# Patient Record
Sex: FEMALE | Race: WHITE | NOT HISPANIC OR LATINO | Employment: FULL TIME | URBAN - NONMETROPOLITAN AREA
[De-identification: names, ages, dates, MRNs, and addresses within clinical notes are randomized per-mention and may not be internally consistent; named-entity substitution may affect disease eponyms.]

---

## 2017-01-06 ENCOUNTER — LAB (OUTPATIENT)
Dept: INTERNAL MEDICINE | Facility: CLINIC | Age: 42
End: 2017-01-06

## 2017-01-06 DIAGNOSIS — E55.9 VITAMIN D DEFICIENCY DISEASE: ICD-10-CM

## 2017-01-06 DIAGNOSIS — I10 ESSENTIAL HYPERTENSION: ICD-10-CM

## 2017-01-06 DIAGNOSIS — G25.81 RLS (RESTLESS LEGS SYNDROME): ICD-10-CM

## 2017-01-06 DIAGNOSIS — M79.7 FIBROMYALGIA: ICD-10-CM

## 2017-01-06 LAB
25(OH)D3 SERPL-MCNC: 33.5 NG/ML
ALBUMIN SERPL-MCNC: 4.2 G/DL (ref 3.2–4.8)
ALBUMIN/GLOB SERPL: 1.4 G/DL (ref 1.5–2.5)
ALP SERPL-CCNC: 84 U/L (ref 25–100)
ALT SERPL W P-5'-P-CCNC: 17 U/L (ref 7–40)
ANION GAP SERPL CALCULATED.3IONS-SCNC: 8 MMOL/L (ref 3–11)
ARTICHOKE IGE QN: 97 MG/DL (ref 0–130)
AST SERPL-CCNC: 19 U/L (ref 0–33)
BASOPHILS # BLD AUTO: 0.03 10*3/MM3 (ref 0–0.2)
BASOPHILS NFR BLD AUTO: 0.4 % (ref 0–1)
BILIRUB SERPL-MCNC: 0.3 MG/DL (ref 0.3–1.2)
BUN BLD-MCNC: 21 MG/DL (ref 9–23)
BUN/CREAT SERPL: 35 (ref 7–25)
CALCIUM SPEC-SCNC: 10 MG/DL (ref 8.7–10.4)
CHLORIDE SERPL-SCNC: 101 MMOL/L (ref 99–109)
CHOLEST SERPL-MCNC: 204 MG/DL (ref 0–200)
CO2 SERPL-SCNC: 30 MMOL/L (ref 20–31)
CREAT BLD-MCNC: 0.6 MG/DL (ref 0.6–1.3)
CRP SERPL-MCNC: 16.4 MG/DL (ref 0–10)
CRP SERPL-MCNC: 16.62 MG/DL (ref 0–5)
DEPRECATED RDW RBC AUTO: 43.6 FL (ref 37–54)
EOSINOPHIL # BLD AUTO: 0.06 10*3/MM3 (ref 0.1–0.3)
EOSINOPHIL NFR BLD AUTO: 0.8 % (ref 0–3)
ERYTHROCYTE [DISTWIDTH] IN BLOOD BY AUTOMATED COUNT: 13.4 % (ref 11.3–14.5)
GFR SERPL CREATININE-BSD FRML MDRD: 110 ML/MIN/1.73
GLOBULIN UR ELPH-MCNC: 2.9 GM/DL
GLUCOSE BLD-MCNC: 83 MG/DL (ref 70–100)
HCT VFR BLD AUTO: 40.1 % (ref 34.5–44)
HCYS SERPL-MCNC: 13.6 UMOL/L (ref 5–13.9)
HDLC SERPL-MCNC: 96 MG/DL (ref 40–60)
HGB BLD-MCNC: 13 G/DL (ref 11.5–15.5)
IMM GRANULOCYTES # BLD: 0.03 10*3/MM3 (ref 0–0.03)
IMM GRANULOCYTES NFR BLD: 0.4 % (ref 0–0.6)
LYMPHOCYTES # BLD AUTO: 2.23 10*3/MM3 (ref 0.6–4.8)
LYMPHOCYTES NFR BLD AUTO: 29.2 % (ref 24–44)
MCH RBC QN AUTO: 28.8 PG (ref 27–31)
MCHC RBC AUTO-ENTMCNC: 32.4 G/DL (ref 32–36)
MCV RBC AUTO: 88.9 FL (ref 80–99)
MONOCYTES # BLD AUTO: 0.73 10*3/MM3 (ref 0–1)
MONOCYTES NFR BLD AUTO: 9.6 % (ref 0–12)
NEUTROPHILS # BLD AUTO: 4.55 10*3/MM3 (ref 1.5–8.3)
NEUTROPHILS NFR BLD AUTO: 59.6 % (ref 41–71)
PLATELET # BLD AUTO: 313 10*3/MM3 (ref 150–450)
PMV BLD AUTO: 9.5 FL (ref 6–12)
POTASSIUM BLD-SCNC: 4.1 MMOL/L (ref 3.5–5.5)
PROT SERPL-MCNC: 7.1 G/DL (ref 5.7–8.2)
RBC # BLD AUTO: 4.51 10*6/MM3 (ref 3.89–5.14)
SODIUM BLD-SCNC: 139 MMOL/L (ref 132–146)
TRIGL SERPL-MCNC: 55 MG/DL (ref 0–150)
TSH SERPL DL<=0.05 MIU/L-ACNC: 1.05 MIU/ML (ref 0.35–5.35)
WBC NRBC COR # BLD: 7.63 10*3/MM3 (ref 3.5–10.8)

## 2017-01-06 PROCEDURE — 82306 VITAMIN D 25 HYDROXY: CPT | Performed by: INTERNAL MEDICINE

## 2017-01-06 PROCEDURE — 86141 C-REACTIVE PROTEIN HS: CPT | Performed by: INTERNAL MEDICINE

## 2017-01-06 PROCEDURE — 80061 LIPID PANEL: CPT | Performed by: INTERNAL MEDICINE

## 2017-01-06 PROCEDURE — 36415 COLL VENOUS BLD VENIPUNCTURE: CPT | Performed by: INTERNAL MEDICINE

## 2017-01-06 PROCEDURE — 85025 COMPLETE CBC W/AUTO DIFF WBC: CPT | Performed by: INTERNAL MEDICINE

## 2017-01-06 PROCEDURE — 80053 COMPREHEN METABOLIC PANEL: CPT | Performed by: INTERNAL MEDICINE

## 2017-01-06 PROCEDURE — 84443 ASSAY THYROID STIM HORMONE: CPT | Performed by: INTERNAL MEDICINE

## 2017-01-06 PROCEDURE — 86140 C-REACTIVE PROTEIN: CPT | Performed by: INTERNAL MEDICINE

## 2017-01-06 PROCEDURE — 83090 ASSAY OF HOMOCYSTEINE: CPT | Performed by: INTERNAL MEDICINE

## 2017-01-09 RX ORDER — DOXYCYCLINE HYCLATE 100 MG/1
CAPSULE ORAL
Qty: 30 CAPSULE | Refills: 5 | Status: SHIPPED | OUTPATIENT
Start: 2017-01-09 | End: 2017-08-03 | Stop reason: SDUPTHER

## 2017-01-11 ENCOUNTER — HOSPITAL ENCOUNTER (OUTPATIENT)
Dept: SLEEP MEDICINE | Facility: HOSPITAL | Age: 42
Discharge: HOME | End: 2017-01-12
Attending: INTERNAL MEDICINE

## 2017-02-01 DIAGNOSIS — G47.33 OSA (OBSTRUCTIVE SLEEP APNEA): Primary | ICD-10-CM

## 2017-02-08 ENCOUNTER — OFFICE VISIT (OUTPATIENT)
Dept: GASTROENTEROLOGY | Facility: CLINIC | Age: 42
End: 2017-02-08

## 2017-02-08 VITALS
DIASTOLIC BLOOD PRESSURE: 92 MMHG | WEIGHT: 150.6 LBS | BODY MASS INDEX: 28.43 KG/M2 | TEMPERATURE: 98 F | HEART RATE: 75 BPM | HEIGHT: 61 IN | OXYGEN SATURATION: 99 % | SYSTOLIC BLOOD PRESSURE: 122 MMHG

## 2017-02-08 DIAGNOSIS — R13.13 PHARYNGEAL DYSPHAGIA: ICD-10-CM

## 2017-02-08 DIAGNOSIS — Z79.1 NSAID LONG-TERM USE: ICD-10-CM

## 2017-02-08 DIAGNOSIS — Z80.0 FAMILY HISTORY OF COLON CANCER: ICD-10-CM

## 2017-02-08 DIAGNOSIS — Z79.2 LONG TERM (CURRENT) USE OF ANTIBIOTICS: ICD-10-CM

## 2017-02-08 DIAGNOSIS — K21.00 GASTROESOPHAGEAL REFLUX DISEASE WITH ESOPHAGITIS: Primary | ICD-10-CM

## 2017-02-08 DIAGNOSIS — K27.9 PUD (PEPTIC ULCER DISEASE): ICD-10-CM

## 2017-02-08 PROCEDURE — 99215 OFFICE O/P EST HI 40 MIN: CPT | Performed by: NURSE PRACTITIONER

## 2017-02-08 RX ORDER — AZITHROMYCIN 1 G
1 PACKET (EA) ORAL
COMMUNITY
End: 2017-08-03

## 2017-02-08 NOTE — PROGRESS NOTES
NEW PATIENT NOTE  Patient: HONEY LAMA : 1975  Date of Service: 2017  Dear Dr.Arthur RAYSHAWN Naylor MD   Thank you for your referral of this patient for evaluation of GERD  CC: Heartburn (Colon/ EGD consult)  Assessment/Plan                                             ASSESSMENT & PLANS     Gastroesophageal reflux disease with esophagitis  Pharyngeal dysphagia  NSAID long-term use  Long term (current) use of antibiotics  Hx of PUD (peptic ulcer disease)  -     Schedule for EGD  · Continue Prilosec 40 mg once daily  · Anti-reflux measures.  Written instructions given.    Chronic constipation.  Symptoms controlled  Family history of colon cancer. 1st cousin w/ colon cancer (stage 4) dx  at age 44  · Continue Linzess once daily  · Screening colonoscopy at age 45     DISCUSSION: The above plan was delineated in details with patient and all questions and concerns were answered.  Patient is also given contact information.  Patient is to return as scheduled or sooner if new problems arise  Subjective                                                     SUBJECTIVE   History of Present Illness  Ms. Honey Lama is a 42 y.o. female presents to the clinic today for an evaluation of Heartburn (Colon/ EGD consult)  Hx of GERD x 17 yrs.  Dr jerez in [place] did EGDs x 2. Hx of erosion. No hx of Hpylori.   Last EGD was done by Dr Mata about 7-8 yrs ago.  Was told that she has a large HH.  Pt was previously treated with Acidphex, protonix, prilosec, nexium.  Has not tried Dexilant d/t insurance    Recently her sx worsens w/  intermittent esophageal dysphagia to solid w/ meat and cornbread  1 soda a day. Takes Naproxen q hs for neck pain r/t fibromyalgia.  Takes daily Doxycycline for acne r/t PCOS (Polycystic Ovarian syndrome). Currently on Zpack   Normally about 1-2 rounds a year for URI sx, but pt took more abx previously    Chronic constipation is controlled with once daily Linzess.Pt denies dark black  stools or bright red blood in the stools, in the toilet bowl, or on the toilet tissue.    ROS:Review of Systems   Constitutional: Negative for activity change, appetite change, fatigue, fever and unexpected weight change.   HENT: Positive for trouble swallowing and voice change. Negative for hearing loss.    Eyes: Negative for visual disturbance.   Respiratory: Positive for cough and choking. Negative for chest tightness and shortness of breath.    Cardiovascular: Negative for chest pain.   Gastrointestinal: Negative for abdominal distention, abdominal pain, anal bleeding, blood in stool, constipation, diarrhea, nausea, rectal pain and vomiting.        Heartburn   Endocrine: Negative for polydipsia and polyphagia.   Genitourinary: Negative.    Musculoskeletal: Negative for gait problem and joint swelling.   Skin: Negative for color change and rash.   Allergic/Immunologic: Negative for food allergies.   Neurological: Negative for dizziness, seizures and speech difficulty.   Hematological: Negative for adenopathy.   Psychiatric/Behavioral: Negative for confusion.     PAST MED HX: Pt  has a past medical history of Allergic rhinitis; Asthma; Bursitis of left hip; Bursitis of left hip; Diarrhea; H/O chest x-ray (2015); Migraine; Nausea with vomiting; Sore throat; and Strep pharyngitis.  PAST SURG HX: Pt  has a past surgical history that includes  section; Cholecystectomy; Breast surgery; Other surgical history; Mouth surgery; and Cystectomy.  FAM HX: family history includes Alzheimer's disease in her maternal grandmother and paternal grandfather; Arthritis in her father, paternal grandfather, and paternal grandmother; Cancer in her maternal aunt, maternal uncle, and other; Colon cancer (stage 4) in her 1st cousin at age 44 s/p surgery and chemo; Diabetes in her father; ABBE disease in her father; Heart attack in her paternal grandfather; Hyperlipidemia in her father; Hypertension in her father, paternal  grandfather, and paternal grandmother; Kidney disease in her father; Other in her maternal grandfather; Stroke in her maternal grandmother and paternal aunt.  SOC HX: Pt  reports that she has never smoked. She does not have any smokeless tobacco history on file. She reports that she drinks alcohol.  Objective                                                           OBJECTIVE   MEDS: •  albuterol (PROAIR HFA) 108 (90 BASE) MCG/ACT inhaler, ProAir  (90 Base) MCG/ACT Inhalation Aerosol Solution; Patient Sig: ProAir  (90 Base) MCG/ACT Inhalation Aerosol Solution INHALE 1 TO 2 PUFFS EVERY 4 TO 6 HOURS AS NEEDED.; 1; 0; 24-Jul-2014; Active, Disp: , Rfl:   •  azithromycin (ZITHROMAX) 1 G powder, Take 1 packet by mouth. For 5 days, Disp: , Rfl:   •  carvedilol (COREG) 12.5 MG tablet, Take 1 tablet by mouth 2 (Two) Times a Day., Disp: 90 tablet, Rfl: 3  •  Cetirizine HCl (ZYRTEC ALLERGY) 10 MG capsule, Take  by mouth., Disp: , Rfl:   •  cyclobenzaprine (FLEXERIL) 10 MG tablet, Take 1 tablet by mouth 3 (Three) Times a Day As Needed for muscle spasms., Disp: 30 tablet, Rfl: 3  •  doxycycline (VIBRAMYCIN) 100 MG capsule, TAKE ONE CAPSULE BY MOUTH ONCE DAILY, Disp: 30 capsule, Rfl: 5  •  DULoxetine (CYMBALTA) 60 MG capsule, Take  by mouth daily., Disp: , Rfl:   •  Linaclotide (LINZESS) 145 MCG capsule, 1 tab daily prn, Disp: 30 capsule, Rfl: 3  •  lisdexamfetamine (VYVANSE) 50 MG capsule, 1 tab daily, Disp: 30 capsule, Rfl: 0  •  metFORMIN (GLUCOPHAGE) 500 MG tablet, Take 1 tablet by mouth 2 (Two) Times a Day With Meals., Disp: 60 tablet, Rfl: 5  •  montelukast (SINGULAIR) 10 MG tablet, TAKE ONE TABLET BY MOUTH AT BEDTIME, Disp: 30 tablet, Rfl: 11  •  naproxen (NAPROSYN) 500 MG tablet, Take  by mouth., Disp: , Rfl:   •  norethindrone-ethinyl estradiol (MICROGESTIN) 1-20 MG-MCG per tablet, Take  by mouth., Disp: , Rfl:   •  omeprazole (PriLOSEC) 40 MG capsule, Take  by mouth daily., Disp: , Rfl:   •  OXcarbazepine  (TRILEPTAL) 300 MG tablet, Take 300 mg by mouth. 2 tablets at bedtime, Disp: , Rfl:   •  pregabalin (LYRICA) 75 MG capsule, Take  by mouth 2 (two) times a day., Disp: , Rfl:   •  spironolactone (ALDACTONE) 25 MG tablet, Take 25 mg by mouth Daily., Disp: , Rfl:   Lab Results   Component Value Date    WBC 7.63 01/06/2017    HGB 13.0 01/06/2017    HCT 40.1 01/06/2017    MCV 88.9 01/06/2017     01/06/2017     Lab Results   Component Value Date    AST 19 01/06/2017    ALT 17 01/06/2017    ALKPHOS 84 01/06/2017    BILITOT 0.3 01/06/2017    ALBUMIN 4.20 01/06/2017     Lab Results   Component Value Date    LIPASE 356 (H) 01/23/2015     Lab Results   Component Value Date     01/06/2017    K 4.1 01/06/2017     01/06/2017    CO2 30.0 01/06/2017    BUN 21 01/06/2017    CREATININE 0.60 01/06/2017    GLUCOSE 83 01/06/2017    CALCIUM 10.0 01/06/2017    ANIONGAP 8.0 01/06/2017     Lab Results   Component Value Date    HGBA1C 5.2 10/20/2015     Wt Readings from Last 5 Encounters:   02/08/17 150 lb 9.6 oz (68.3 kg)   12/28/16 149 lb (67.6 kg)   12/13/16 149 lb (67.6 kg)   06/01/16 149 lb (67.6 kg)   10/14/15 142 lb 15.9 oz (64.9 kg)   body mass index is 28.69 kg/(m^2).,Temp: 98 °F (36.7 °C),BP: 122/92,Heart Rate: 75   Physical Exam  General Well developed; well nourished; no acute distress.   ENT Good dentition.  Oral mucosa pink & moist without thrush or lesions.    Neck Neck supple; trachea midline. No thyromegaly   Resp CTA; no rhonchi, rales, or wheezes.  Respiration effort normal  CV RRR; normal S1, S2; no M/R/G. No lower extremity edema  GI Abd soft, NT, ND, normal active bowel sounds.  No HSM.  No abd hernia  Skin No rash; no lesions; no bruises.  Skin turgor normal  Musc No clubbing; no cyanosis.  Gait steady  Psych Oriented to time, place, and person.  Appropriate affect  Thank you kindly for allowing me to be part of this patient’s care.    Pt care team: Hayley PLASCENCIA & Roseann Guidry,  A  [facility]--Gastroenterology  [phone]    CC: Dr.Arthur RAYSHAWN Naylor MD  [address] [place] [address] / [place] [address] FAX:[phone]

## 2017-02-08 NOTE — PATIENT INSTRUCTIONS
Avoid NSAIDs (Non-Steroid Anti-Inflammatory Drugs) products (i.e Ibuprofen, Aleve, Advil, Exedrin, BC Powder, diclofenac, meloxicam, & Naproxen, etc). Talk to your doctor/medical provider for alternative approach for management such as physical therapy, exercises, muscle relaxants, etc.     In some cases, your doctor will have to keep you on NSAIDS products to treat your other medical conditions.  Should you need to continue any NSAID product,  please continue to take your acid reducer.  Be be aware of long-term and frequent NSAIDS' potential side effects. These include, but limited to, stomach ulcer, bleeding risks, and kidney injury. Take the medication with food and to stop if you experience any GI upset. Call if you have vomitting, abdominal pain, or black/bloody stools.     Hiatal Hernia  A hiatal hernia occurs when part of your stomach slides above the muscle that separates your abdomen from your chest (diaphragm). You can be born with a hiatal hernia (congenital), or it may develop over time. In almost all cases of hiatal hernia, only the top part of the stomach pushes through.   Many people have a hiatal hernia with no symptoms. The larger the hernia, the more likely that you will have symptoms. In some cases, a hiatal hernia allows stomach acid to flow back into the tube that carries food from your mouth to your stomach (esophagus). This may cause heartburn symptoms. Severe heartburn symptoms may mean you have developed a condition called gastroesophageal reflux disease (GERD).   CAUSES   Hiatal hernias are caused by a weakness in the opening (hiatus) where your esophagus passes through your diaphragm to attach to the upper part of your stomach. You may be born with a weakness in your hiatus, or a weakness can develop.  RISK FACTORS  Older age is a major risk factor for a hiatal hernia. Anything that increases pressure on your diaphragm can also increase your risk of a hiatal hernia. This  includes:  · Pregnancy.  · Excess weight.  · Frequent constipation.  SIGNS AND SYMPTOMS   People with a hiatal hernia often have no symptoms. If symptoms develop, they are almost always caused by GERD. They may include:  · Heartburn.  · Belching.  · Indigestion.  · Trouble swallowing.  · Coughing or wheezing.   · Sore throat.  · Hoarseness.  · Chest pain.  DIAGNOSIS   A hiatal hernia is sometimes found during an exam for another problem. Your health care provider may suspect a hiatal hernia if you have symptoms of GERD. Tests may be done to diagnose GERD. These may include:  · X-rays of your stomach or chest.  · An upper gastrointestinal (GI) series. This is an X-ray exam of your GI tract involving the use of a chalky liquid that you swallow. The liquid shows up clearly on the X-ray.  · Endoscopy. This is a procedure to look into your stomach using a thin, flexible tube that has a tiny camera and light on the end of it.  TREATMENT   If you have no symptoms, you may not need treatment. If you have symptoms, treatment may include:  · Dietary and lifestyle changes to help reduce GERD symptoms.  · Medicines. These may include:    Over-the-counter antacids.    Medicines that make your stomach empty more quickly.    Medicines that block the production of stomach acid (H2 blockers).    Stronger medicines to reduce stomach acid (proton pump inhibitors).  · You may need surgery to repair the hernia if other treatments are not helping.  HOME CARE INSTRUCTIONS   · Take all medicines as directed by your health care provider.  · Quit smoking, if you smoke.  · Try to achieve and maintain a healthy body weight.  · Eat frequent small meals instead of three large meals a day. This keeps your stomach from getting too full.    Eat slowly.    Do not lie down right after eating.     Do not eat 1-2 hours before bed.    · Do not drink beverages with caffeine. These include cola, coffee, cocoa, and tea.  · Do not drink alcohol.  · Avoid  foods that can make symptoms of GERD worse. These may include:    Fatty foods.    Citrus fruits.    Other foods and drinks that contain acid.  · Avoid putting pressure on your belly. Anything that puts pressure on your belly increases the amount of acid that may be pushed up into your esophagus.      Avoid bending over, especially after eating.    Raise the head of your bed by putting blocks under the legs. This keeps your head and esophagus higher than your stomach.    Do not wear tight clothing around your chest or stomach.    Try not to strain when having a bowel movement, when urinating, or when lifting heavy objects.  SEEK MEDICAL CARE IF:  · Your symptoms are not controlled with medicines or lifestyle changes.  · You are having trouble swallowing.  · You have coughing or wheezing that will not go away.  SEEK IMMEDIATE MEDICAL CARE IF:  · Your pain is getting worse.  · Your pain spreads to your arms, neck, jaw, teeth, or back.  · You have shortness of breath.  · You sweat for no reason.  · You feel sick to your stomach (nauseous) or vomit.  · You vomit blood.  · You have bright red blood in your stools.  · You have black, tarry stools.       This information is not intended to replace advice given to you by your health care provider. Make sure you discuss any questions you have with your health care provider.     Document Released: 03/09/2005 Document Revised: 01/08/2016 Document Reviewed: 12/05/2014  [employer] Interactive Patient Education ©2016 [employer]  Food Choices for Gastroesophageal Reflux Disease, Adult  When you have gastroesophageal reflux disease (GERD), the foods you eat and your eating habits are very important. Choosing the right foods can help ease the discomfort of GERD.  WHAT GENERAL GUIDELINES DO I NEED TO FOLLOW?  · Choose fruits, vegetables, whole grains, low-fat dairy products, and low-fat meat, fish, and poultry.  · Limit fats such as oils, salad dressings, butter, nuts, and  avocado.  · Keep a food diary to identify foods that cause symptoms.  · Avoid foods that cause reflux. These may be different for different people.  · Eat frequent small meals instead of three large meals each day.  · Eat your meals slowly, in a relaxed setting.  · Limit fried foods.  · Cook foods using methods other than frying.  · Avoid drinking alcohol.  · Avoid drinking large amounts of liquids with your meals.  · Avoid bending over or lying down until 2-3 hours after eating.  WHAT FOODS ARE NOT RECOMMENDED?  The following are some foods and drinks that may worsen your symptoms:  Vegetables  Tomatoes. Tomato juice. Tomato and spaghetti sauce. Chili peppers. Onion and garlic. Horseradish.  Fruits  Oranges, grapefruit, and lemon (fruit and juice).  Meats  High-fat meats, fish, and poultry. This includes hot dogs, ribs, ham, sausage, salami, and calzada.  Dairy  Whole milk and chocolate milk. Sour cream. Cream. Butter. Ice cream. Cream cheese.   Beverages  Coffee and tea, with or without caffeine. Carbonated beverages or energy drinks.  Condiments  Hot sauce. Barbecue sauce.   Sweets/Desserts  Chocolate and cocoa. Donuts. Peppermint and spearmint.  Fats and Oils  High-fat foods, including French fries and potato chips.  Other  Vinegar. Strong spices, such as black pepper, white pepper, red pepper, cayenne, scott powder, cloves, ginger, and chili powder.  The items listed above may not be a complete list of foods and beverages to avoid. Contact your dietitian for more information.     This information is not intended to replace advice given to you by your health care provider. Make sure you discuss any questions you have with your health care provider.     Document Released: 12/18/2006 Document Revised: 01/08/2016 Document Reviewed: 10/22/2014  [employer] Interactive Patient Education ©2016 [employer]    Gastroesophageal Reflux Disease, Adult  Normally, food travels down the esophagus and stays in the stomach to be  digested. However, when a person has gastroesophageal reflux disease (GERD), food and stomach acid move back up into the esophagus. When this happens, the esophagus becomes sore and inflamed. Over time, GERD can create small holes (ulcers) in the lining of the esophagus.   CAUSES  This condition is caused by a problem with the muscle between the esophagus and the stomach (lower esophageal sphincter, or LES). Normally, the LES muscle closes after food passes through the esophagus to the stomach. When the LES is weakened or abnormal, it does not close properly, and that allows food and stomach acid to go back up into the esophagus. The LES can be weakened by certain dietary substances, medicines, and medical conditions, including:  · Tobacco use.  · Pregnancy.  · Having a hiatal hernia.  · Heavy alcohol use.  · Certain foods and beverages, such as coffee, chocolate, onions, and peppermint.  RISK FACTORS  This condition is more likely to develop in:  · People who have an increased body weight.  · People who have connective tissue disorders.  · People who use NSAID medicines.  SYMPTOMS  Symptoms of this condition include:  · Heartburn.  · Difficult or painful swallowing.  · The feeling of having a lump in the throat.  · A bitter taste in the mouth.  · Bad breath.  · Having a large amount of saliva.  · Having an upset or bloated stomach.  · Belching.  · Chest pain.  · Shortness of breath or wheezing.  · Ongoing (chronic) cough or a night-time cough.  · Wearing away of tooth enamel.  · Weight loss.  Different conditions can cause chest pain. Make sure to see your health care provider if you experience chest pain.  DIAGNOSIS  Your health care provider will take a medical history and perform a physical exam. To determine if you have mild or severe GERD, your health care provider may also monitor how you respond to treatment. You may also have other tests, including:  · An endoscopy to examine your stomach and esophagus with  a small camera.  · A test that measures the acidity level in your esophagus.  · A test that measures how much pressure is on your esophagus.  · A barium swallow or modified barium swallow to show the shape, size, and functioning of your esophagus.  TREATMENT  The goal of treatment is to help relieve your symptoms and to prevent complications. Treatment for this condition may vary depending on how severe your symptoms are. Your health care provider may recommend:  · Changes to your diet.  · Medicine.  · Surgery.  HOME CARE INSTRUCTIONS  Diet  · Follow a diet as recommended by your health care provider. This may involve avoiding foods and drinks such as:    Coffee and tea (with or without caffeine).    Drinks that contain alcohol.    Energy drinks and sports drinks.    Carbonated drinks or sodas.    Chocolate and cocoa.    Peppermint and mint flavorings.    Garlic and onions.    Horseradish.    Spicy and acidic foods, including peppers, chili powder, scott powder, vinegar, hot sauces, and barbecue sauce.    Citrus fruit juices and citrus fruits, such as oranges, arun, and limes.    Tomato-based foods, such as red sauce, chili, salsa, and pizza with red sauce.    Fried and fatty foods, such as donuts, french fries, potato chips, and high-fat dressings.    High-fat meats, such as hot dogs and fatty cuts of red and white meats, such as rib eye steak, sausage, ham, and calzada.    High-fat dairy items, such as whole milk, butter, and cream cheese.  · Eat small, frequent meals instead of large meals.  · Avoid drinking large amounts of liquid with your meals.  · Avoid eating meals during the 2-3 hours before bedtime.  · Avoid lying down right after you eat.  · Do not exercise right after you eat.   General Instructions   · Pay attention to any changes in your symptoms.  · Take over-the-counter and prescription medicines only as told by your health care provider. Do not take aspirin, ibuprofen, or other NSAIDs unless your  health care provider told you to do so.  · Do not use any tobacco products, including cigarettes, chewing tobacco, and e-cigarettes. If you need help quitting, ask your health care provider.  · Wear loose-fitting clothing. Do not wear anything tight around your waist that causes pressure on your abdomen.  · Raise (elevate) the head of your bed 6 inches (15cm).  · Try to reduce your stress, such as with yoga or meditation. If you need help reducing stress, ask your health care provider.  · If you are overweight, reduce your weight to an amount that is healthy for you. Ask your health care provider for guidance about a safe weight loss goal.  · Keep all follow-up visits as told by your health care provider. This is important.  SEEK MEDICAL CARE IF:  · You have new symptoms.  · You have unexplained weight loss.  · You have difficulty swallowing, or it hurts to swallow.  · You have wheezing or a persistent cough.  · Your symptoms do not improve with treatment.  · You have a hoarse voice.  SEEK IMMEDIATE MEDICAL CARE IF:  · You have pain in your arms, neck, jaw, teeth, or back.  · You feel sweaty, dizzy, or light-headed.  · You have chest pain or shortness of breath.  · You vomit and your vomit looks like blood or coffee grounds.  · You faint.  · Your stool is bloody or black.  · You cannot swallow, drink, or eat.     This information is not intended to replace advice given to you by your health care provider. Make sure you discuss any questions you have with your health care provider.     Document Released: 09/27/2006 Document Revised: 09/07/2016 Document Reviewed: 04/13/2016  [employer] Interactive Patient Education ©2016 [employer]

## 2017-03-03 ENCOUNTER — HOSPITAL ENCOUNTER (OUTPATIENT)
Dept: SLEEP MEDICINE | Facility: HOSPITAL | Age: 42
Setting detail: THERAPIES SERIES
End: 2017-03-03
Attending: INTERNAL MEDICINE

## 2017-03-03 DIAGNOSIS — G47.33 OSA (OBSTRUCTIVE SLEEP APNEA): ICD-10-CM

## 2017-03-03 PROCEDURE — 95810 POLYSOM 6/> YRS 4/> PARAM: CPT | Performed by: INTERNAL MEDICINE

## 2017-04-25 RX ORDER — OMEPRAZOLE 40 MG/1
CAPSULE, DELAYED RELEASE ORAL
Qty: 90 CAPSULE | Refills: 0 | Status: SHIPPED | OUTPATIENT
Start: 2017-04-25 | End: 2017-07-25 | Stop reason: SDUPTHER

## 2017-05-08 RX ORDER — CYCLOBENZAPRINE HCL 10 MG
TABLET ORAL
Qty: 30 TABLET | Refills: 0 | OUTPATIENT
Start: 2017-05-08

## 2017-05-18 ENCOUNTER — OFFICE VISIT (OUTPATIENT)
Dept: PULMONOLOGY | Facility: CLINIC | Age: 42
End: 2017-05-18

## 2017-05-18 VITALS
WEIGHT: 146.6 LBS | HEART RATE: 91 BPM | SYSTOLIC BLOOD PRESSURE: 115 MMHG | HEIGHT: 61 IN | RESPIRATION RATE: 14 BRPM | OXYGEN SATURATION: 99 % | BODY MASS INDEX: 27.68 KG/M2 | DIASTOLIC BLOOD PRESSURE: 72 MMHG

## 2017-05-18 DIAGNOSIS — J30.89 OTHER ALLERGIC RHINITIS: ICD-10-CM

## 2017-05-18 DIAGNOSIS — G47.19 EXCESSIVE DAYTIME SLEEPINESS: Primary | ICD-10-CM

## 2017-05-18 DIAGNOSIS — J45.30 MILD PERSISTENT ASTHMA WITHOUT COMPLICATION: ICD-10-CM

## 2017-05-18 PROCEDURE — 99214 OFFICE O/P EST MOD 30 MIN: CPT | Performed by: INTERNAL MEDICINE

## 2017-05-18 RX ORDER — FLUNISOLIDE 0.25 MG/ML
1 SOLUTION NASAL EVERY 12 HOURS
Qty: 1 BOTTLE | Refills: 5 | Status: SHIPPED | OUTPATIENT
Start: 2017-05-18 | End: 2018-05-24

## 2017-06-12 RX ORDER — CYCLOBENZAPRINE HCL 5 MG
5 TABLET ORAL 3 TIMES DAILY PRN
Qty: 30 TABLET | Refills: 3 | Status: SHIPPED | OUTPATIENT
Start: 2017-06-12 | End: 2018-09-04 | Stop reason: SDUPTHER

## 2017-07-12 RX ORDER — CARVEDILOL 12.5 MG/1
TABLET ORAL
Qty: 90 TABLET | Refills: 3 | Status: SHIPPED | OUTPATIENT
Start: 2017-07-12 | End: 2018-01-24 | Stop reason: SDUPTHER

## 2017-07-26 RX ORDER — OMEPRAZOLE 40 MG/1
CAPSULE, DELAYED RELEASE ORAL
Qty: 90 CAPSULE | Refills: 3 | Status: SHIPPED | OUTPATIENT
Start: 2017-07-26 | End: 2018-05-24

## 2017-08-03 ENCOUNTER — OFFICE VISIT (OUTPATIENT)
Dept: INTERNAL MEDICINE | Facility: CLINIC | Age: 42
End: 2017-08-03

## 2017-08-03 VITALS
BODY MASS INDEX: 26.7 KG/M2 | HEIGHT: 61 IN | DIASTOLIC BLOOD PRESSURE: 68 MMHG | SYSTOLIC BLOOD PRESSURE: 106 MMHG | WEIGHT: 141.4 LBS | TEMPERATURE: 98.8 F | RESPIRATION RATE: 14 BRPM | OXYGEN SATURATION: 98 % | HEART RATE: 96 BPM

## 2017-08-03 DIAGNOSIS — K21.9 GASTROESOPHAGEAL REFLUX DISEASE, ESOPHAGITIS PRESENCE NOT SPECIFIED: ICD-10-CM

## 2017-08-03 DIAGNOSIS — G57.30 PERONEAL NEUROPATHY, UNSPECIFIED LATERALITY: ICD-10-CM

## 2017-08-03 DIAGNOSIS — G47.30 SLEEP APNEA, UNSPECIFIED TYPE: ICD-10-CM

## 2017-08-03 DIAGNOSIS — E07.9 THYROID MASS: ICD-10-CM

## 2017-08-03 DIAGNOSIS — M79.7 FIBROMYALGIA: ICD-10-CM

## 2017-08-03 DIAGNOSIS — M79.661 RIGHT CALF PAIN: ICD-10-CM

## 2017-08-03 DIAGNOSIS — G25.81 RLS (RESTLESS LEGS SYNDROME): ICD-10-CM

## 2017-08-03 DIAGNOSIS — I10 ESSENTIAL HYPERTENSION: Primary | ICD-10-CM

## 2017-08-03 DIAGNOSIS — F50.81 BINGE EATING DISORDER: ICD-10-CM

## 2017-08-03 PROCEDURE — 99214 OFFICE O/P EST MOD 30 MIN: CPT | Performed by: INTERNAL MEDICINE

## 2017-08-03 RX ORDER — CHOLECALCIFEROL (VITAMIN D3) 125 MCG
5000 CAPSULE ORAL 3 TIMES DAILY
COMMUNITY

## 2017-08-03 RX ORDER — DOXYCYCLINE HYCLATE 100 MG/1
CAPSULE ORAL
Qty: 30 CAPSULE | Refills: 0 | Status: SHIPPED | OUTPATIENT
Start: 2017-08-03 | End: 2017-09-06 | Stop reason: SDUPTHER

## 2017-08-03 RX ORDER — LEVONORGESTREL AND ETHINYL ESTRADIOL 0.15-0.03
1 KIT ORAL DAILY
COMMUNITY
End: 2018-10-31

## 2017-08-03 RX ORDER — GRAPE SEED EXT/BIOFLAV,CITRUS 50MG-250MG
CAPSULE ORAL DAILY
COMMUNITY
End: 2018-05-24

## 2017-08-03 NOTE — PROGRESS NOTES
Subjective   Honey Lama is a 42 y.o. female.     Chief Complaint   Patient presents with   • Pain     when laying down there is pain in the right calf  - only while laying, any where not just the bed       History of Present Illness   Patient here for follow-up.  Hypertension stable medication.  The weight loss on purpose.  The Vyvanse to help some.  Anxiety depression stable medication.  Patient still has significant reflux heartburn symptoms.  Patient is taking omeprazole.  Patient does have a large hiatal hernia painting upper endoscopy.  CRP elevated and a high sensitivity CRP also elevated.  Patient complains of right  calf pain only when laying down.  Gall in nature moderate tenderness.  No numbness tingling or burning sensation.  The denies any active pain.  Denies any significant restless leg syndrome.  Vitamin D low on supplement to stable.  Fibromyalgia patient is seeing a rheumatologist.  Recently father developed thyroid cancer patient does have history of a neck mass OR cell was negative at that time.  No apparent mass palpated.    Current Outpatient Prescriptions:   •  albuterol (PROAIR RESPICLICK) 108 (90 BASE) MCG/ACT inhaler, Inhale 1 puff Every 4 (Four) Hours As Needed for Wheezing or Shortness of Air., Disp: 1 inhaler, Rfl: 0  •  Black Cohosh 540 MG capsule, Take  by mouth., Disp: , Rfl:   •  carvedilol (COREG) 12.5 MG tablet, TAKE ONE TABLET BY MOUTH TWICE DAILY, Disp: 90 tablet, Rfl: 3  •  Cetirizine HCl (ZYRTEC ALLERGY) 10 MG capsule, Take  by mouth., Disp: , Rfl:   •  Cholecalciferol (VITAMIN D3) 2000 UNITS tablet, Take  by mouth., Disp: , Rfl:   •  cyclobenzaprine (FLEXERIL) 5 MG tablet, Take 1 tablet by mouth 3 (Three) Times a Day As Needed for Muscle Spasms., Disp: 30 tablet, Rfl: 3  •  doxycycline (VIBRAMYCIN) 100 MG capsule, TAKE ONE CAPSULE BY MOUTH ONCE DAILY, Disp: 30 capsule, Rfl: 0  •  DULoxetine (CYMBALTA) 60 MG capsule, Take  by mouth daily., Disp: , Rfl:   •  flunisolide  (NASALIDE) 25 MCG/ACT (0.025%) solution nasal spray, Inhale 1 spray Every 12 (Twelve) Hours., Disp: 1 bottle, Rfl: 5  •  levonorgestrel-ethinyl estradiol (JOLESSA) 0.15-0.03 MG per tablet, Take 1 tablet by mouth Daily., Disp: , Rfl:   •  Linaclotide (LINZESS) 145 MCG capsule, 1 tab daily prn, Disp: 30 capsule, Rfl: 3  •  lisdexamfetamine (VYVANSE) 50 MG capsule, 1 tab daily, Disp: 30 capsule, Rfl: 0  •  montelukast (SINGULAIR) 10 MG tablet, TAKE ONE TABLET BY MOUTH AT BEDTIME, Disp: 30 tablet, Rfl: 11  •  naproxen (NAPROSYN) 500 MG tablet, Take  by mouth., Disp: , Rfl:   •  omeprazole (priLOSEC) 40 MG capsule, TAKE ONE CAPSULE BY MOUTH ONCE DAILY, Disp: 90 capsule, Rfl: 3  •  OXcarbazepine (TRILEPTAL) 300 MG tablet, Take 300 mg by mouth. 2 tablets at bedtime, Disp: , Rfl:   •  pregabalin (LYRICA) 75 MG capsule, Take  by mouth 2 (two) times a day., Disp: , Rfl:   •  spironolactone (ALDACTONE) 25 MG tablet, Take 25 mg by mouth Daily., Disp: , Rfl:     The following portions of the patient's history were reviewed and updated as appropriate: allergies, current medications, past family history, past medical history, past social history, past surgical history and problem list.    Review of Systems   Constitutional: Negative.    Respiratory: Negative.    Cardiovascular: Negative.    Gastrointestinal: Negative.    Endocrine: Negative.         No apparent mass now   Musculoskeletal: Negative.    Skin: Negative.    Neurological: Negative.    Psychiatric/Behavioral: Negative.        Objective   Physical Exam   Constitutional: She is oriented to person, place, and time. She appears well-nourished.   Neck: Neck supple. No tracheal deviation present. No thyromegaly present.   Cardiovascular: Normal rate, regular rhythm and normal heart sounds.    Pulmonary/Chest: Effort normal and breath sounds normal.   Abdominal: Bowel sounds are normal.   Lymphadenopathy:     She has no cervical adenopathy.   Neurological: She is alert and  oriented to person, place, and time.   Skin: Skin is warm.   Psychiatric: She has a normal mood and affect.       All tests have been reviewed.    Assessment/Plan   There are no diagnoses linked to this encounter.           HTN, continue medicine     Binge eating, continue med helped      acne, continue medicine      PCOS, metfomin self d/c'd for low sugar per patient      fibromyalgia dx'd by rheum continue medicine      anxiety depression. continue medicine, psych f/u      GERD continue medicine, raise the head of back , hope to be able to replace PPI with zantac. Avoid foods 3 hr before sleep.  EGD pending      constipation prn  linzess helps      neck pain naproxen prn      asthma continue medicine prn      BCP continue follow up gyn      CRP high, follow rheum      large HH, if REflux sx, pending EGD,  ?surgery.      peroneal n neuropathy lalitha at knee fibular head, continue med     Sleep apnea, negative sleep study     RLS mild and watch for now     Vit D low continue supplement     ceruloplasmin high copper high, seen by GI [facility] no liver prob. Probable rheum related but no other rheum disease per rheumatologist.      FH colon ca, do colon GI rec wait for a few years    hCRP high start ASA 81mg daily--    Right calf pain  with lying down, raise the head of the bed to see if helps. Do XR --    FH thyroid ca , hx of neck mass , us thyroid.---          vacUTD  F/u depend on results  PE in 1/2018

## 2017-08-15 ENCOUNTER — OUTSIDE FACILITY SERVICE (OUTPATIENT)
Dept: GASTROENTEROLOGY | Facility: CLINIC | Age: 42
End: 2017-08-15

## 2017-08-15 ENCOUNTER — LAB REQUISITION (OUTPATIENT)
Dept: LAB | Facility: HOSPITAL | Age: 42
End: 2017-08-15

## 2017-08-15 DIAGNOSIS — K21.00 GASTRO-ESOPHAGEAL REFLUX DISEASE WITH ESOPHAGITIS: ICD-10-CM

## 2017-08-15 PROCEDURE — 88305 TISSUE EXAM BY PATHOLOGIST: CPT | Performed by: INTERNAL MEDICINE

## 2017-08-15 PROCEDURE — 43239 EGD BIOPSY SINGLE/MULTIPLE: CPT | Performed by: INTERNAL MEDICINE

## 2017-08-16 LAB
CYTO UR: NORMAL
LAB AP CASE REPORT: NORMAL
LAB AP CLINICAL INFORMATION: NORMAL
Lab: NORMAL
PATH REPORT.FINAL DX SPEC: NORMAL
PATH REPORT.GROSS SPEC: NORMAL

## 2017-08-17 ENCOUNTER — HOSPITAL ENCOUNTER (OUTPATIENT)
Dept: ULTRASOUND IMAGING | Facility: HOSPITAL | Age: 42
Discharge: HOME | End: 2017-08-17
Attending: INTERNAL MEDICINE | Admitting: INTERNAL MEDICINE

## 2017-08-17 ENCOUNTER — HOSPITAL ENCOUNTER (OUTPATIENT)
Dept: GENERAL RADIOLOGY | Facility: HOSPITAL | Age: 42
Discharge: HOME | End: 2017-08-17
Attending: INTERNAL MEDICINE

## 2017-08-17 DIAGNOSIS — M79.661 RIGHT CALF PAIN: ICD-10-CM

## 2017-08-17 DIAGNOSIS — E07.9 THYROID MASS: ICD-10-CM

## 2017-08-17 PROCEDURE — 76536 US EXAM OF HEAD AND NECK: CPT

## 2017-08-17 PROCEDURE — 72110 X-RAY EXAM L-2 SPINE 4/>VWS: CPT

## 2017-08-20 NOTE — PROGRESS NOTES
Roseann  Pls let pt know that EGD pathology shows chronic esophagitis/ gastritis (inflammation of esophagus/stomach).  No other abnormality.     1.  Continue current reflux medication.      2.  Avoid NSAIDS products (ie Ibuprofen, Advil, Aleve, Meloxicam, Naproxen, Exedrin)    Teresa  Pls schedule pt for follow up in 2-3 months or sooner if symptoms persist despite current medication.

## 2017-08-21 ENCOUNTER — TELEPHONE (OUTPATIENT)
Dept: GASTROENTEROLOGY | Facility: CLINIC | Age: 42
End: 2017-08-21

## 2017-08-21 ENCOUNTER — PATIENT MESSAGE (OUTPATIENT)
Dept: GASTROENTEROLOGY | Facility: CLINIC | Age: 42
End: 2017-08-21

## 2017-08-21 NOTE — TELEPHONE ENCOUNTER
Patient has some questions about her EGD performed last week, please call her concerning the results. She is concerned with some of the findings on this. Thank you.

## 2017-08-22 ENCOUNTER — PATIENT MESSAGE (OUTPATIENT)
Dept: GASTROENTEROLOGY | Facility: CLINIC | Age: 42
End: 2017-08-22

## 2017-08-24 DIAGNOSIS — Z79.899 ENCOUNTER FOR MONITORING LONG-TERM PROTON PUMP INHIBITOR THERAPY: ICD-10-CM

## 2017-08-24 DIAGNOSIS — K44.9 HIATAL HERNIA: Primary | ICD-10-CM

## 2017-08-24 DIAGNOSIS — K21.9 GASTROESOPHAGEAL REFLUX DISEASE, ESOPHAGITIS PRESENCE NOT SPECIFIED: ICD-10-CM

## 2017-08-24 DIAGNOSIS — Z51.81 ENCOUNTER FOR MONITORING LONG-TERM PROTON PUMP INHIBITOR THERAPY: ICD-10-CM

## 2017-09-06 RX ORDER — DOXYCYCLINE HYCLATE 100 MG/1
CAPSULE ORAL
Qty: 30 CAPSULE | Refills: 0 | Status: SHIPPED | OUTPATIENT
Start: 2017-09-06 | End: 2017-10-12 | Stop reason: SDUPTHER

## 2017-10-04 ENCOUNTER — OFFICE VISIT (OUTPATIENT)
Dept: SURGERY | Facility: CLINIC | Age: 42
End: 2017-10-04

## 2017-10-04 VITALS
SYSTOLIC BLOOD PRESSURE: 138 MMHG | WEIGHT: 136.2 LBS | BODY MASS INDEX: 26.74 KG/M2 | HEART RATE: 95 BPM | HEIGHT: 60 IN | DIASTOLIC BLOOD PRESSURE: 88 MMHG | OXYGEN SATURATION: 98 % | TEMPERATURE: 98.2 F

## 2017-10-04 DIAGNOSIS — R10.13 EPIGASTRIC PAIN: ICD-10-CM

## 2017-10-04 DIAGNOSIS — K21.00 GASTROESOPHAGEAL REFLUX DISEASE WITH ESOPHAGITIS: Primary | ICD-10-CM

## 2017-10-04 PROCEDURE — 99204 OFFICE O/P NEW MOD 45 MIN: CPT | Performed by: SURGERY

## 2017-10-04 NOTE — PROGRESS NOTES
Patient: Honey Lama    YOB: 1975    Date: 10/04/2017    Primary Care Provider: Antonio Naylor MD    Reason for Consultation:Epigastric pain, GERD    Chief complaint:   Chief Complaint   Patient presents with   • Hiatal Hernia     Evalation of hiatal hernia and heartburn   • Heartburn       Subjective .     History of present illness:  I saw the patient in the office  today as a consultation for evaluation and treatment of a hiatal hernia from an EGD years ago.  She complains of choking, dysphagia, and coughing after she eats and drinks.  She also complains of hoarseness but denies any reflux or sore throat.  She had an EGD in [place] about a month ago by Dr Singh. Patient has had chronic reflux esophagitis 17 years, on PPI medication since.  Continues to have breakthrough symptoms and feels like medication is not working anymore.  No significant stenosis seen, patient does have significant esophagitis.  She would like to consider a laparoscopic Nissen fundoplication.    Review of Systems   Constitutional: Negative for activity change, appetite change and chills.   HENT: Positive for trouble swallowing and voice change. Negative for congestion and hearing loss.    Respiratory: Negative for cough, choking, chest tightness and shortness of breath.    Cardiovascular: Negative for chest pain, palpitations and leg swelling.   Endocrine: Negative for cold intolerance and heat intolerance.   Genitourinary: Negative for dysuria, flank pain and frequency.   Musculoskeletal: Negative for back pain and myalgias.   Skin: Negative for color change and rash.   Neurological: Negative for seizures, facial asymmetry, light-headedness, numbness and headaches.   Psychiatric/Behavioral: Negative for agitation, behavioral problems and confusion.       History:  Past Medical History:   Diagnosis Date   • Allergic rhinitis    • Asthma    • Bursitis of left hip     h/o   • Bursitis of left hip    • Diarrhea    •  H/O chest x-ray 2015    No acute cardiopulmonary process   • Migraine    • Nausea with vomiting    • Sore throat    • Strep pharyngitis        Past Surgical History:   Procedure Laterality Date   • BREAST SURGERY      biopsy   •  SECTION     • CHOLECYSTECTOMY     • CYSTECTOMY      pilonidal cyst resection   • MOUTH SURGERY      tooth extraction   • OTHER SURGICAL HISTORY       History of Neuroplasty With Transposition Of Ulnar Nerve - At Elbow       Family History   Problem Relation Age of Onset   • Arthritis Father    • Diabetes Father    • Hyperlipidemia Father    • Hypertension Father    • Kidney disease Father    • ABBE disease Father    • Thyroid cancer Father    • Cancer Maternal Aunt      Lung   • Cancer Maternal Uncle      Lung   • Stroke Paternal Aunt    • Alzheimer's disease Maternal Grandmother    • Stroke Maternal Grandmother    • Other Maternal Grandfather      pacemaker   • Arthritis Paternal Grandmother    • Hypertension Paternal Grandmother    • Heart attack Paternal Grandfather    • Alzheimer's disease Paternal Grandfather    • Arthritis Paternal Grandfather    • Hypertension Paternal Grandfather    • Cancer Other    • Colon cancer Cousin        Social History   Substance Use Topics   • Smoking status: Never Smoker   • Smokeless tobacco: None   • Alcohol use Yes      Comment: ocassionally       Allergies:  Allergies   Allergen Reactions   • Minocycline    • Rifampin    • Sulfamethoxazole-Trimethoprim        Medications:    Current Outpatient Prescriptions:   •  albuterol (PROAIR RESPICLICK) 108 (90 BASE) MCG/ACT inhaler, Inhale 1 puff Every 4 (Four) Hours As Needed for Wheezing or Shortness of Air., Disp: 1 inhaler, Rfl: 0  •  Black Cohosh 540 MG capsule, Take  by mouth., Disp: , Rfl:   •  carvedilol (COREG) 12.5 MG tablet, TAKE ONE TABLET BY MOUTH TWICE DAILY, Disp: 90 tablet, Rfl: 3  •  Cetirizine HCl (ZYRTEC ALLERGY) 10 MG capsule, Take  by mouth., Disp: , Rfl:   •  Cholecalciferol  (VITAMIN D3) 2000 UNITS tablet, Take  by mouth., Disp: , Rfl:   •  cyclobenzaprine (FLEXERIL) 5 MG tablet, Take 1 tablet by mouth 3 (Three) Times a Day As Needed for Muscle Spasms., Disp: 30 tablet, Rfl: 3  •  doxycycline (VIBRAMYCIN) 100 MG capsule, TAKE ONE CAPSULE BY MOUTH ONCE DAILY, Disp: 30 capsule, Rfl: 0  •  DULoxetine (CYMBALTA) 60 MG capsule, Take  by mouth daily., Disp: , Rfl:   •  flunisolide (NASALIDE) 25 MCG/ACT (0.025%) solution nasal spray, Inhale 1 spray Every 12 (Twelve) Hours., Disp: 1 bottle, Rfl: 5  •  levonorgestrel-ethinyl estradiol (JOLESSA) 0.15-0.03 MG per tablet, Take 1 tablet by mouth Daily., Disp: , Rfl:   •  Linaclotide (LINZESS) 145 MCG capsule, 1 tab daily prn, Disp: 30 capsule, Rfl: 3  •  lisdexamfetamine (VYVANSE) 50 MG capsule, 1 tab daily, Disp: 30 capsule, Rfl: 0  •  montelukast (SINGULAIR) 10 MG tablet, TAKE ONE TABLET BY MOUTH AT BEDTIME, Disp: 30 tablet, Rfl: 11  •  naproxen (NAPROSYN) 500 MG tablet, Take  by mouth., Disp: , Rfl:   •  omeprazole (priLOSEC) 40 MG capsule, TAKE ONE CAPSULE BY MOUTH ONCE DAILY, Disp: 90 capsule, Rfl: 3  •  OXcarbazepine (TRILEPTAL) 300 MG tablet, Take 300 mg by mouth. 2 tablets at bedtime, Disp: , Rfl:   •  pregabalin (LYRICA) 75 MG capsule, Take  by mouth 2 (two) times a day., Disp: , Rfl:   •  spironolactone (ALDACTONE) 25 MG tablet, Take 25 mg by mouth Daily., Disp: , Rfl:     Objective     Vital Signs:   Temp:  [98.2 °F (36.8 °C)] 98.2 °F (36.8 °C)  Heart Rate:  [95] 95  BP: (138)/(88) 138/88    Physical Exam:   General Appearance:    Alert, cooperative, in no acute distress   Head:    Normocephalic, without obvious abnormality, atraumatic   Eyes:            Lids and lashes normal, conjunctivae and sclerae normal, no   icterus, no pallor, corneas clear, PERRLA   Ears:    Ears appear intact with no abnormalities noted   Throat:   No oral lesions, no thrush, oral mucosa moist   Neck:   No adenopathy, supple, trachea midline, no thyromegaly, no    carotid bruit, no JVD   Lungs:     Clear to auscultation,respirations regular, even and                  unlabored    Heart:    Regular rhythm and normal rate, normal S1 and S2, no            murmur, no gallop, no rub, no click   Chest Wall:    No abnormalities observed   Abdomen:     Normal bowel sounds, no masses, no organomegaly, soft        non-tender, non-distended, no guarding, there is evidence of epigastric  tenderness   Extremities:   Moves all extremities well, no edema, no cyanosis, no             redness   Pulses:   Pulses palpable and equal bilaterally   Skin:   No bleeding, bruising or rash   Lymph nodes:   No palpable adenopathy   Neurologic:   Cranial nerves 2 - 12 grossly intact, sensation intact     Results Review:   I reviewed the patient's new clinical results.    Review of Systems was reviewed and confirmed as accurate today.    Assessment/Plan     1. Gastroesophageal reflux disease with esophagitis    2. Epigastric pain        I did have a detailed and extensive discussion with the patient in the office and they understand that they need to undergo Laparoscopic Nissen fundoplication.  Risk of bleeding, esophageal injury and postoperative dysphagia discussed and patient agreeable.  She will call to schedule procedure at her convenience.. Full risks and benefits of operative versus nonoperative intervention were discussed with the patient and these include bleeding and esophageal injury. The patient understands, agrees, and wishes to proceed with the surgical treatment plan as mentioned above. The patient had no questions for me at the end of the discussion.       I discussed the patients findings and my recommendations with patient.     Electronically signed by Megha Torres MD  10/04/17 9:59 AM    Scribed for Megha Torres MD by Michelle Milligan. 10/4/2017  10:45 AM

## 2017-10-05 ENCOUNTER — TELEPHONE (OUTPATIENT)
Dept: INTERNAL MEDICINE | Facility: CLINIC | Age: 42
End: 2017-10-05

## 2017-10-06 ENCOUNTER — PREP FOR SURGERY (OUTPATIENT)
Dept: OTHER | Facility: HOSPITAL | Age: 42
End: 2017-10-06

## 2017-10-06 DIAGNOSIS — K21.9 GERD (GASTROESOPHAGEAL REFLUX DISEASE): Primary | ICD-10-CM

## 2017-10-10 ENCOUNTER — APPOINTMENT (OUTPATIENT)
Dept: PREADMISSION TESTING | Facility: HOSPITAL | Age: 42
End: 2017-10-10

## 2017-10-10 ENCOUNTER — HOSPITAL ENCOUNTER (OUTPATIENT)
Dept: GENERAL RADIOLOGY | Facility: HOSPITAL | Age: 42
Discharge: HOME | End: 2017-10-10
Admitting: SURGERY

## 2017-10-10 VITALS — WEIGHT: 136 LBS | HEIGHT: 61 IN | BODY MASS INDEX: 25.68 KG/M2

## 2017-10-10 LAB
ANION GAP SERPL CALCULATED.3IONS-SCNC: 15.3 MMOL/L
BUN BLD-MCNC: 18 MG/DL (ref 7–20)
BUN/CREAT SERPL: 30 (ref 7.1–23.5)
CALCIUM SPEC-SCNC: 9.4 MG/DL (ref 8.4–10.2)
CHLORIDE SERPL-SCNC: 104 MMOL/L (ref 98–107)
CO2 SERPL-SCNC: 27 MMOL/L (ref 26–30)
CREAT BLD-MCNC: 0.6 MG/DL (ref 0.6–1.3)
DEPRECATED RDW RBC AUTO: 41.6 FL (ref 37–54)
ERYTHROCYTE [DISTWIDTH] IN BLOOD BY AUTOMATED COUNT: 13.1 % (ref 11.5–14.5)
GFR SERPL CREATININE-BSD FRML MDRD: 110 ML/MIN/1.73
GLUCOSE BLD-MCNC: 87 MG/DL (ref 74–98)
HCT VFR BLD AUTO: 39.2 % (ref 37–47)
HGB BLD-MCNC: 12.9 G/DL (ref 12–16)
MCH RBC QN AUTO: 28.8 PG (ref 27–31)
MCHC RBC AUTO-ENTMCNC: 32.9 G/DL (ref 30–37)
MCV RBC AUTO: 87.5 FL (ref 81–99)
PLATELET # BLD AUTO: 289 10*3/MM3 (ref 130–400)
PMV BLD AUTO: 9.8 FL (ref 6–12)
POTASSIUM BLD-SCNC: 4.3 MMOL/L (ref 3.5–5.1)
RBC # BLD AUTO: 4.48 10*6/MM3 (ref 4.2–5.4)
SODIUM BLD-SCNC: 142 MMOL/L (ref 137–145)
WBC NRBC COR # BLD: 6.64 10*3/MM3 (ref 4.8–10.8)

## 2017-10-10 PROCEDURE — 80048 BASIC METABOLIC PNL TOTAL CA: CPT | Performed by: SURGERY

## 2017-10-10 PROCEDURE — 36415 COLL VENOUS BLD VENIPUNCTURE: CPT

## 2017-10-10 PROCEDURE — 71010 HC CHEST PA OR AP: CPT

## 2017-10-10 PROCEDURE — 93005 ELECTROCARDIOGRAM TRACING: CPT | Performed by: SURGERY

## 2017-10-10 PROCEDURE — 85027 COMPLETE CBC AUTOMATED: CPT | Performed by: SURGERY

## 2017-10-12 RX ORDER — DOXYCYCLINE HYCLATE 100 MG/1
CAPSULE ORAL
Qty: 30 CAPSULE | Refills: 0 | Status: SHIPPED | OUTPATIENT
Start: 2017-10-12 | End: 2017-11-14 | Stop reason: SDUPTHER

## 2017-10-17 ENCOUNTER — APPOINTMENT (OUTPATIENT)
Dept: PREADMISSION TESTING | Facility: HOSPITAL | Age: 42
End: 2017-10-17

## 2017-10-25 RX ORDER — MONTELUKAST SODIUM 10 MG/1
TABLET ORAL
Qty: 30 TABLET | Refills: 11 | Status: SHIPPED | OUTPATIENT
Start: 2017-10-25 | End: 2018-10-01 | Stop reason: SDUPTHER

## 2017-10-29 ENCOUNTER — ANESTHESIA EVENT (OUTPATIENT)
Dept: PERIOP | Facility: HOSPITAL | Age: 42
End: 2017-10-29

## 2017-10-30 ENCOUNTER — ANESTHESIA (OUTPATIENT)
Dept: PERIOP | Facility: HOSPITAL | Age: 42
End: 2017-10-30

## 2017-10-30 ENCOUNTER — HOSPITAL ENCOUNTER (OUTPATIENT)
Facility: HOSPITAL | Age: 42
Setting detail: HOSPITAL OUTPATIENT SURGERY
Discharge: HOME | End: 2017-10-30
Attending: SURGERY | Admitting: SURGERY

## 2017-10-30 VITALS
SYSTOLIC BLOOD PRESSURE: 142 MMHG | RESPIRATION RATE: 18 BRPM | DIASTOLIC BLOOD PRESSURE: 88 MMHG | HEART RATE: 74 BPM | TEMPERATURE: 97.5 F | OXYGEN SATURATION: 96 %

## 2017-10-30 DIAGNOSIS — K21.9 GERD (GASTROESOPHAGEAL REFLUX DISEASE): ICD-10-CM

## 2017-10-30 LAB — B-HCG UR QL: NEGATIVE

## 2017-10-30 PROCEDURE — 25010000002 MIDAZOLAM PER 1 MG: Performed by: NURSE ANESTHETIST, CERTIFIED REGISTERED

## 2017-10-30 PROCEDURE — 25010000002 ONDANSETRON PER 1 MG: Performed by: NURSE ANESTHETIST, CERTIFIED REGISTERED

## 2017-10-30 PROCEDURE — 25010000002 DEXAMETHASONE PER 1 MG: Performed by: NURSE ANESTHETIST, CERTIFIED REGISTERED

## 2017-10-30 PROCEDURE — 94799 UNLISTED PULMONARY SVC/PX: CPT

## 2017-10-30 PROCEDURE — 81025 URINE PREGNANCY TEST: CPT | Performed by: SURGERY

## 2017-10-30 PROCEDURE — 25010000002 HYDROMORPHONE PER 4 MG

## 2017-10-30 PROCEDURE — 25010000002 FENTANYL CITRATE (PF) 250 MCG/5ML SOLUTION: Performed by: NURSE ANESTHETIST, CERTIFIED REGISTERED

## 2017-10-30 PROCEDURE — 25010000003 CEFAZOLIN PER 500 MG: Performed by: SURGERY

## 2017-10-30 PROCEDURE — 25010000002 PROPOFOL 200 MG/20ML EMULSION: Performed by: NURSE ANESTHETIST, CERTIFIED REGISTERED

## 2017-10-30 PROCEDURE — 43280 LAPAROSCOPY FUNDOPLASTY: CPT | Performed by: SURGERY

## 2017-10-30 RX ORDER — SODIUM CHLORIDE 0.9 % (FLUSH) 0.9 %
3 SYRINGE (ML) INJECTION AS NEEDED
Status: DISCONTINUED | OUTPATIENT
Start: 2017-10-30 | End: 2017-10-30 | Stop reason: HOSPADM

## 2017-10-30 RX ORDER — DEXAMETHASONE SODIUM PHOSPHATE 4 MG/ML
INJECTION, SOLUTION INTRA-ARTICULAR; INTRALESIONAL; INTRAMUSCULAR; INTRAVENOUS; SOFT TISSUE AS NEEDED
Status: DISCONTINUED | OUTPATIENT
Start: 2017-10-30 | End: 2017-10-30 | Stop reason: SURG

## 2017-10-30 RX ORDER — SODIUM CHLORIDE, SODIUM LACTATE, POTASSIUM CHLORIDE, CALCIUM CHLORIDE 600; 310; 30; 20 MG/100ML; MG/100ML; MG/100ML; MG/100ML
1000 INJECTION, SOLUTION INTRAVENOUS CONTINUOUS PRN
Status: DISCONTINUED | OUTPATIENT
Start: 2017-10-30 | End: 2017-10-30 | Stop reason: HOSPADM

## 2017-10-30 RX ORDER — ONDANSETRON 2 MG/ML
4 INJECTION INTRAMUSCULAR; INTRAVENOUS ONCE
Status: DISCONTINUED | OUTPATIENT
Start: 2017-10-30 | End: 2017-10-30 | Stop reason: HOSPADM

## 2017-10-30 RX ORDER — FENTANYL CITRATE 50 UG/ML
INJECTION, SOLUTION INTRAMUSCULAR; INTRAVENOUS AS NEEDED
Status: DISCONTINUED | OUTPATIENT
Start: 2017-10-30 | End: 2017-10-30

## 2017-10-30 RX ORDER — ONDANSETRON 2 MG/ML
4 INJECTION INTRAMUSCULAR; INTRAVENOUS ONCE AS NEEDED
Status: DISCONTINUED | OUTPATIENT
Start: 2017-10-30 | End: 2017-10-30 | Stop reason: HOSPADM

## 2017-10-30 RX ORDER — HYDROCODONE BITARTRATE AND ACETAMINOPHEN 7.5; 325 MG/1; MG/1
TABLET ORAL
Status: COMPLETED
Start: 2017-10-30 | End: 2017-10-30

## 2017-10-30 RX ORDER — GLYCOPYRROLATE 0.2 MG/ML
INJECTION INTRAMUSCULAR; INTRAVENOUS AS NEEDED
Status: DISCONTINUED | OUTPATIENT
Start: 2017-10-30 | End: 2017-10-30 | Stop reason: SURG

## 2017-10-30 RX ORDER — BUPIVACAINE HYDROCHLORIDE 5 MG/ML
INJECTION, SOLUTION EPIDURAL; INTRACAUDAL AS NEEDED
Status: DISCONTINUED | OUTPATIENT
Start: 2017-10-30 | End: 2017-10-30 | Stop reason: HOSPADM

## 2017-10-30 RX ORDER — HYDROCODONE BITARTRATE AND ACETAMINOPHEN 5; 325 MG/1; MG/1
1-2 TABLET ORAL EVERY 4 HOURS PRN
Qty: 24 TABLET | Refills: 0 | Status: SHIPPED | OUTPATIENT
Start: 2017-10-30 | End: 2018-05-24

## 2017-10-30 RX ORDER — FENTANYL CITRATE 50 UG/ML
INJECTION, SOLUTION INTRAMUSCULAR; INTRAVENOUS AS NEEDED
Status: DISCONTINUED | OUTPATIENT
Start: 2017-10-30 | End: 2017-10-30 | Stop reason: SURG

## 2017-10-30 RX ORDER — NEOSTIGMINE METHYLSULFATE 5 MG/5 ML
SYRINGE (ML) INTRAVENOUS AS NEEDED
Status: DISCONTINUED | OUTPATIENT
Start: 2017-10-30 | End: 2017-10-30 | Stop reason: SURG

## 2017-10-30 RX ORDER — MIDAZOLAM HYDROCHLORIDE 1 MG/ML
INJECTION INTRAMUSCULAR; INTRAVENOUS AS NEEDED
Status: DISCONTINUED | OUTPATIENT
Start: 2017-10-30 | End: 2017-10-30 | Stop reason: SURG

## 2017-10-30 RX ORDER — MAGNESIUM HYDROXIDE 1200 MG/15ML
LIQUID ORAL AS NEEDED
Status: DISCONTINUED | OUTPATIENT
Start: 2017-10-30 | End: 2017-10-30 | Stop reason: HOSPADM

## 2017-10-30 RX ORDER — SCOLOPAMINE TRANSDERMAL SYSTEM 1 MG/1
PATCH, EXTENDED RELEASE TRANSDERMAL AS NEEDED
Status: DISCONTINUED | OUTPATIENT
Start: 2017-10-30 | End: 2017-10-30 | Stop reason: SURG

## 2017-10-30 RX ORDER — MEPERIDINE HYDROCHLORIDE 50 MG/ML
50 INJECTION INTRAMUSCULAR; INTRAVENOUS; SUBCUTANEOUS ONCE
Status: DISCONTINUED | OUTPATIENT
Start: 2017-10-30 | End: 2017-10-30 | Stop reason: HOSPADM

## 2017-10-30 RX ORDER — SCOLOPAMINE TRANSDERMAL SYSTEM 1 MG/1
PATCH, EXTENDED RELEASE TRANSDERMAL
Status: DISCONTINUED
Start: 2017-10-30 | End: 2017-10-30 | Stop reason: HOSPADM

## 2017-10-30 RX ORDER — HYDROCODONE BITARTRATE AND ACETAMINOPHEN 7.5; 325 MG/1; MG/1
1 TABLET ORAL EVERY 6 HOURS PRN
Status: DISCONTINUED | OUTPATIENT
Start: 2017-10-30 | End: 2017-10-30 | Stop reason: HOSPADM

## 2017-10-30 RX ORDER — PROMETHAZINE HYDROCHLORIDE 25 MG/ML
12.5 INJECTION, SOLUTION INTRAMUSCULAR; INTRAVENOUS ONCE AS NEEDED
Status: DISCONTINUED | OUTPATIENT
Start: 2017-10-30 | End: 2017-10-30 | Stop reason: HOSPADM

## 2017-10-30 RX ORDER — SCOLOPAMINE TRANSDERMAL SYSTEM 1 MG/1
1 PATCH, EXTENDED RELEASE TRANSDERMAL ONCE
Status: DISCONTINUED | OUTPATIENT
Start: 2017-10-30 | End: 2017-10-30 | Stop reason: HOSPADM

## 2017-10-30 RX ORDER — BUPIVACAINE HYDROCHLORIDE 5 MG/ML
INJECTION, SOLUTION EPIDURAL; INTRACAUDAL
Status: DISCONTINUED
Start: 2017-10-30 | End: 2017-10-30 | Stop reason: HOSPADM

## 2017-10-30 RX ORDER — ONDANSETRON 2 MG/ML
INJECTION INTRAMUSCULAR; INTRAVENOUS AS NEEDED
Status: DISCONTINUED | OUTPATIENT
Start: 2017-10-30 | End: 2017-10-30 | Stop reason: SURG

## 2017-10-30 RX ORDER — PROPOFOL 10 MG/ML
INJECTION, EMULSION INTRAVENOUS AS NEEDED
Status: DISCONTINUED | OUTPATIENT
Start: 2017-10-30 | End: 2017-10-30 | Stop reason: SURG

## 2017-10-30 RX ORDER — ROCURONIUM BROMIDE 10 MG/ML
INJECTION, SOLUTION INTRAVENOUS AS NEEDED
Status: DISCONTINUED | OUTPATIENT
Start: 2017-10-30 | End: 2017-10-30 | Stop reason: SURG

## 2017-10-30 RX ADMIN — GLYCOPYRROLATE 0.2 MG: 0.2 INJECTION, SOLUTION INTRAMUSCULAR; INTRAVENOUS at 07:51

## 2017-10-30 RX ADMIN — HYDROCODONE BITARTRATE AND ACETAMINOPHEN 1 TABLET: 7.5; 325 TABLET ORAL at 10:31

## 2017-10-30 RX ADMIN — MIDAZOLAM HYDROCHLORIDE 2 MG: 1 INJECTION, SOLUTION INTRAMUSCULAR; INTRAVENOUS at 07:14

## 2017-10-30 RX ADMIN — DEXAMETHASONE SODIUM PHOSPHATE 8 MG: 4 INJECTION, SOLUTION INTRAMUSCULAR; INTRAVENOUS at 07:56

## 2017-10-30 RX ADMIN — PROPOFOL 100 MG: 10 INJECTION, EMULSION INTRAVENOUS at 07:26

## 2017-10-30 RX ADMIN — Medication 4 MG: at 08:25

## 2017-10-30 RX ADMIN — GLYCOPYRROLATE 0.8 MG: 0.2 INJECTION, SOLUTION INTRAMUSCULAR; INTRAVENOUS at 08:25

## 2017-10-30 RX ADMIN — Medication 1 MG: at 09:03

## 2017-10-30 RX ADMIN — ROCURONIUM BROMIDE 5 MG: 10 INJECTION INTRAVENOUS at 08:06

## 2017-10-30 RX ADMIN — SCOPALAMINE 1 PATCH: 1 PATCH, EXTENDED RELEASE TRANSDERMAL at 07:13

## 2017-10-30 RX ADMIN — CEFAZOLIN SODIUM 2 G: 2 SOLUTION INTRAVENOUS at 07:20

## 2017-10-30 RX ADMIN — HYDROMORPHONE HYDROCHLORIDE 1 MG: 1 INJECTION, SOLUTION INTRAMUSCULAR; INTRAVENOUS; SUBCUTANEOUS at 09:03

## 2017-10-30 RX ADMIN — Medication 100 MCG: at 07:45

## 2017-10-30 RX ADMIN — FENTANYL CITRATE 50 MCG: 50 INJECTION, SOLUTION INTRAMUSCULAR; INTRAVENOUS at 07:24

## 2017-10-30 RX ADMIN — Medication 100 MCG: at 07:40

## 2017-10-30 RX ADMIN — SODIUM CHLORIDE, POTASSIUM CHLORIDE, SODIUM LACTATE AND CALCIUM CHLORIDE 1000 ML: 600; 310; 30; 20 INJECTION, SOLUTION INTRAVENOUS at 07:00

## 2017-10-30 RX ADMIN — SODIUM CHLORIDE, POTASSIUM CHLORIDE, SODIUM LACTATE AND CALCIUM CHLORIDE: 600; 310; 30; 20 INJECTION, SOLUTION INTRAVENOUS at 07:22

## 2017-10-30 RX ADMIN — ROCURONIUM BROMIDE 10 MG: 10 INJECTION INTRAVENOUS at 07:25

## 2017-10-30 RX ADMIN — LIDOCAINE HYDROCHLORIDE 40 MG: 20 INJECTION, SOLUTION INTRAVENOUS at 07:25

## 2017-10-30 RX ADMIN — FENTANYL CITRATE 50 MCG: 50 INJECTION, SOLUTION INTRAMUSCULAR; INTRAVENOUS at 07:29

## 2017-10-30 RX ADMIN — Medication 100 MCG: at 07:36

## 2017-10-30 RX ADMIN — Medication 100 MCG: at 07:32

## 2017-10-30 RX ADMIN — ONDANSETRON 4 MG: 2 INJECTION INTRAMUSCULAR; INTRAVENOUS at 07:56

## 2017-10-30 RX ADMIN — ROCURONIUM BROMIDE 20 MG: 10 INJECTION INTRAVENOUS at 07:26

## 2017-10-30 NOTE — ANESTHESIA PROCEDURE NOTES
Airway  Urgency: elective    Airway not difficult    General Information and Staff    Patient location during procedure: OR  CRNA: WESTLEY CROFT    Indications and Patient Condition  Indications for airway management: airway protection    Preoxygenated: yes (3 min)  Mask difficulty assessment: 1 - vent by mask    Final Airway Details  Final airway type: endotracheal airway      Successful airway: ETT  Cuffed: yes   Successful intubation technique: direct laryngoscopy  Facilitating devices/methods: intubating stylet  Endotracheal tube insertion site: oral  Blade: Matthew  Blade size: #3  ETT size: 7.0 mm  Cormack-Lehane Classification: grade IIa - partial view of glottis  Placement verified by: chest auscultation, capnometry and palpation of cuff   Cuff volume (mL): 5  Measured from: lips  ETT to lips (cm): 20  Number of attempts at approach: 1    Additional Comments  Intubated by dvnt, cuff up with mov, bbs and expansion =, + etco, taped at lips, tolerated induction and intubation without adverse rx.

## 2017-10-30 NOTE — ANESTHESIA POSTPROCEDURE EVALUATION
Patient: Honey Lama    Procedure Summary     Date Anesthesia Start Anesthesia Stop Room / Location    10/30/17 0722   [facility] /  [facility]       Procedure Diagnosis Surgeon Provider    NISSEN FUNDOPLICATION LAPAROSCOPIC (N/A Abdomen) GERD (gastroesophageal reflux disease)  (GERD (gastroesophageal reflux disease) [K21.9]) MD Mode Plascencia CRNA          Anesthesia Type: general  Last vitals  BP   121/74 (10/30/17 0645)   Temp   97.8 °F (36.6 °C) (10/30/17 0645)   Pulse   70 (10/30/17 0645)   Resp   16 (10/30/17 0645)     SpO2   97 % (10/30/17 0645)     Post Anesthesia Care and Evaluation    Patient location during evaluation: PACU  Patient participation: complete - patient participated  Level of consciousness: awake  Pain score: 0  Pain management: adequate  Airway patency: patent  Anesthetic complications: No anesthetic complications  PONV Status: none  Cardiovascular status: acceptable  Respiratory status: acceptable and face mask  Hydration status: acceptable    Comments: vsss resp spont, reflexes intact, responsive, report given to pacu nurse

## 2017-10-30 NOTE — ANESTHESIA PREPROCEDURE EVALUATION
Anesthesia Evaluation     Patient summary reviewed and Nursing notes reviewed   no history of anesthetic complications:  NPO Solid Status: > 8 hours  NPO Liquid Status: Waived due to emergency     Airway   Dental      Pulmonary    (+) asthma, recent URI, sleep apnea,   Cardiovascular     Patient on routine beta blocker    (+) hypertension,       Neuro/Psych  (+) headaches, tremors, numbness, psychiatric history Anxiety and Depression,    GI/Hepatic/Renal/Endo    (+)  GERD,     Musculoskeletal     (+) arthralgias, back pain, chronic pain, myalgias,   Abdominal    Substance History   (+) alcohol use,      OB/GYN          Other   (+) arthritis     ROS/Med Hx Other: Lab reviewed   cxr nad  ekg sr                              Anesthesia Plan    ASA 3     general   (Risks and benefits discussed including risk of aspiration, recall and dental damage. All patient questions answered. Will continue with POC.)  intravenous induction   Anesthetic plan and risks discussed with patient.

## 2017-11-10 ENCOUNTER — OFFICE VISIT (OUTPATIENT)
Dept: SURGERY | Facility: CLINIC | Age: 42
End: 2017-11-10

## 2017-11-10 VITALS
DIASTOLIC BLOOD PRESSURE: 80 MMHG | HEIGHT: 61 IN | WEIGHT: 130.4 LBS | SYSTOLIC BLOOD PRESSURE: 150 MMHG | BODY MASS INDEX: 24.62 KG/M2 | OXYGEN SATURATION: 98 % | TEMPERATURE: 98.3 F | HEART RATE: 107 BPM

## 2017-11-10 DIAGNOSIS — Z48.89 POSTOPERATIVE VISIT: Primary | ICD-10-CM

## 2017-11-10 PROCEDURE — 99024 POSTOP FOLLOW-UP VISIT: CPT | Performed by: SURGERY

## 2017-11-10 NOTE — PROGRESS NOTES
"Patient: Honey Lama    YOB: 1975    Date: 11/10/2017    Primary Care Provider: Antonio Naylor MD    Chief Complaint:   Chief Complaint   Patient presents with   • Post-op     Patient is here for a post op lap nissen       History: patient is here for a post op Lap Nissen.  Patient states that she has been having some chest pain.  Patient states that the pain is mainly in the morning, patient states that the pain is getting better.  Patient denies any nausea or vomiting, patient states that her bowels are moving more now.    Review of Systems    Vital Signs  /80  Pulse 107  Temp 98.3 °F (36.8 °C)  Ht 60.75\" (154.3 cm)  Wt 130 lb 6.4 oz (59.1 kg)  SpO2 98%  BMI 24.84 kg/m2    Allergies:  Allergies   Allergen Reactions   • Minocycline    • Rifampin    • Sulfamethoxazole-Trimethoprim        Medications:    Current Outpatient Prescriptions:   •  albuterol (PROAIR RESPICLICK) 108 (90 BASE) MCG/ACT inhaler, Inhale 1 puff Every 4 (Four) Hours As Needed for Wheezing or Shortness of Air., Disp: 1 inhaler, Rfl: 0  •  Black Cohosh 540 MG capsule, Take  by mouth Daily., Disp: , Rfl:   •  carvedilol (COREG) 12.5 MG tablet, TAKE ONE TABLET BY MOUTH TWICE DAILY, Disp: 90 tablet, Rfl: 3  •  Cetirizine HCl (ZYRTEC ALLERGY) 10 MG capsule, Take  by mouth Daily As Needed., Disp: , Rfl:   •  Cholecalciferol (VITAMIN D3) 2000 UNITS tablet, Take  by mouth Daily., Disp: , Rfl:   •  cyclobenzaprine (FLEXERIL) 5 MG tablet, Take 1 tablet by mouth 3 (Three) Times a Day As Needed for Muscle Spasms. (Patient taking differently: Take 5 mg by mouth At Night As Needed for Muscle Spasms.), Disp: 30 tablet, Rfl: 3  •  doxycycline (VIBRAMYCIN) 100 MG capsule, TAKE ONE CAPSULE BY MOUTH ONCE DAILY, Disp: 30 capsule, Rfl: 0  •  DULoxetine (CYMBALTA) 60 MG capsule, Take  by mouth daily., Disp: , Rfl:   •  flunisolide (NASALIDE) 25 MCG/ACT (0.025%) solution nasal spray, Inhale 1 spray Every 12 (Twelve) Hours. (Patient " taking differently: Inhale 1 spray Every 12 (Twelve) Hours As Needed.), Disp: 1 bottle, Rfl: 5  •  HYDROcodone-acetaminophen (NORCO) 5-325 MG per tablet, Take 1-2 tablets by mouth Every 4 (Four) Hours As Needed (Pain)., Disp: 24 tablet, Rfl: 0  •  levonorgestrel-ethinyl estradiol (JOLESSA) 0.15-0.03 MG per tablet, Take 1 tablet by mouth Daily., Disp: , Rfl:   •  Linaclotide (LINZESS) 145 MCG capsule, 1 tab daily prn, Disp: 30 capsule, Rfl: 3  •  lisdexamfetamine (VYVANSE) 50 MG capsule, 1 tab daily, Disp: 30 capsule, Rfl: 0  •  montelukast (SINGULAIR) 10 MG tablet, TAKE ONE TABLET BY MOUTH AT BEDTIME, Disp: 30 tablet, Rfl: 11  •  naproxen (NAPROSYN) 500 MG tablet, Take  by mouth 2 (Two) Times a Day As Needed., Disp: , Rfl:   •  omeprazole (priLOSEC) 40 MG capsule, TAKE ONE CAPSULE BY MOUTH ONCE DAILY, Disp: 90 capsule, Rfl: 3  •  OXcarbazepine (TRILEPTAL) 300 MG tablet, Take 300 mg by mouth. 2 tablets at bedtime, Disp: , Rfl:   •  pregabalin (LYRICA) 75 MG capsule, Take  by mouth 2 (two) times a day., Disp: , Rfl:   •  spironolactone (ALDACTONE) 25 MG tablet, Take 25 mg by mouth Daily., Disp: , Rfl:     Physical Exam:   General Appearance:    Alert, cooperative, in no acute distress   Head:    Normocephalic, without obvious abnormality, atraumatic   Lungs:     Clear to auscultation,respirations regular, even and                  unlabored    Heart:    Regular rhythm and normal rate, normal S1 and S2, no            murmur, no gallop, no rub, no click   Abdomen:     Normal bowel sounds, no masses, no organomegaly, soft        non-tender, non-distended, no guarding, no rebound                tenderness   Extremities:   Moves all extremities well, no edema, no cyanosis, no             redness   Pulses:   Pulses palpable and equal bilaterally   Skin:   No bleeding, bruising or rash      Assessment/Plan     1. Postoperative visit      Advanced to a soft diet, follow-up one year for repeat EGD.  Patient instructed to stop  taking Prilosec.    Electronically signed by Megha Torres MD  11/10/17   Scribed for Megha Torres MD by Clarisa Colindres. 11/10/2017  3:37 PM

## 2017-11-14 RX ORDER — DOXYCYCLINE HYCLATE 100 MG/1
CAPSULE ORAL
Qty: 30 CAPSULE | Refills: 0 | Status: SHIPPED | OUTPATIENT
Start: 2017-11-14 | End: 2017-12-18 | Stop reason: SDUPTHER

## 2017-12-02 RX ORDER — FLUCONAZOLE 150 MG/1
150 TABLET ORAL ONCE
Qty: 1 TABLET | Refills: 1 | Status: SHIPPED | OUTPATIENT
Start: 2017-12-02 | End: 2017-12-02

## 2017-12-17 RX ORDER — DOXYCYCLINE HYCLATE 100 MG/1
CAPSULE ORAL
Qty: 30 CAPSULE | Refills: 0 | Status: CANCELLED | OUTPATIENT
Start: 2017-12-17

## 2017-12-18 RX ORDER — DOXYCYCLINE HYCLATE 100 MG/1
100 CAPSULE ORAL DAILY
Qty: 30 CAPSULE | Refills: 5 | Status: SHIPPED | OUTPATIENT
Start: 2017-12-18 | End: 2018-07-05 | Stop reason: SDUPTHER

## 2018-01-24 RX ORDER — CARVEDILOL 12.5 MG/1
TABLET ORAL
Qty: 90 TABLET | Refills: 3 | Status: SHIPPED | OUTPATIENT
Start: 2018-01-24 | End: 2018-09-04 | Stop reason: SDUPTHER

## 2018-04-17 ENCOUNTER — TRANSCRIBE ORDERS (OUTPATIENT)
Dept: ADMINISTRATIVE | Facility: HOSPITAL | Age: 43
End: 2018-04-17

## 2018-04-17 DIAGNOSIS — Z12.31 ENCOUNTER FOR SCREENING MAMMOGRAM FOR MALIGNANT NEOPLASM OF BREAST: Primary | ICD-10-CM

## 2018-05-07 ENCOUNTER — TELEPHONE (OUTPATIENT)
Dept: INTERNAL MEDICINE | Facility: CLINIC | Age: 43
End: 2018-05-07

## 2018-05-07 DIAGNOSIS — M79.7 FIBROMYALGIA: ICD-10-CM

## 2018-05-07 DIAGNOSIS — R53.83 OTHER FATIGUE: ICD-10-CM

## 2018-05-07 DIAGNOSIS — I10 ESSENTIAL HYPERTENSION: Primary | ICD-10-CM

## 2018-05-07 DIAGNOSIS — K21.9 GASTROESOPHAGEAL REFLUX DISEASE, ESOPHAGITIS PRESENCE NOT SPECIFIED: ICD-10-CM

## 2018-05-07 DIAGNOSIS — R63.4 WEIGHT LOSS: ICD-10-CM

## 2018-05-07 DIAGNOSIS — G25.81 RLS (RESTLESS LEGS SYNDROME): ICD-10-CM

## 2018-05-07 NOTE — TELEPHONE ENCOUNTER
Pt has to get labs with Rheumatology and is requesting labs from you to get done all at the same time. Pt has had more weight loss and is no longer maintaining. Requesting TSH and any other yearly labs.

## 2018-05-17 ENCOUNTER — LAB (OUTPATIENT)
Dept: LAB | Facility: HOSPITAL | Age: 43
End: 2018-05-17

## 2018-05-17 ENCOUNTER — TRANSCRIBE ORDERS (OUTPATIENT)
Dept: ADMINISTRATIVE | Facility: HOSPITAL | Age: 43
End: 2018-05-17

## 2018-05-17 DIAGNOSIS — Z79.1 ENCOUNTER FOR LONG-TERM (CURRENT) USE OF NSAIDS: ICD-10-CM

## 2018-05-17 DIAGNOSIS — R79.82 ELEVATED C-REACTIVE PROTEIN (CRP): ICD-10-CM

## 2018-05-17 DIAGNOSIS — I73.00 SECONDARY RAYNAUD'S PHENOMENON: Primary | ICD-10-CM

## 2018-05-17 DIAGNOSIS — R94.131 ELECTROMYOGRAM (EMG) ABNORMAL: ICD-10-CM

## 2018-05-17 DIAGNOSIS — I73.00 SECONDARY RAYNAUD'S PHENOMENON: ICD-10-CM

## 2018-05-17 LAB
25(OH)D3 SERPL-MCNC: 42.6 NG/ML
ALBUMIN SERPL-MCNC: 4.4 G/DL (ref 3.5–5)
ALBUMIN/GLOB SERPL: 1.4 G/DL (ref 1–2)
ALP SERPL-CCNC: 81 U/L (ref 38–126)
ALT SERPL W P-5'-P-CCNC: 21 U/L (ref 13–69)
ANION GAP SERPL CALCULATED.3IONS-SCNC: 13.8 MMOL/L (ref 10–20)
AST SERPL-CCNC: 24 U/L (ref 15–46)
BASOPHILS # BLD AUTO: 0.04 10*3/MM3 (ref 0–0.2)
BASOPHILS NFR BLD AUTO: 0.5 % (ref 0–2.5)
BILIRUB SERPL-MCNC: 0.4 MG/DL (ref 0.2–1.3)
BUN BLD-MCNC: 18 MG/DL (ref 7–20)
BUN/CREAT SERPL: 36 (ref 7.1–23.5)
CALCIUM SPEC-SCNC: 9.2 MG/DL (ref 8.4–10.2)
CHLORIDE SERPL-SCNC: 101 MMOL/L (ref 98–107)
CHOLEST SERPL-MCNC: 176 MG/DL (ref 0–199)
CO2 SERPL-SCNC: 29 MMOL/L (ref 26–30)
CREAT BLD-MCNC: 0.5 MG/DL (ref 0.6–1.3)
CRP SERPL-MCNC: 0.7 MG/DL (ref 0–1)
DEPRECATED RDW RBC AUTO: 40.1 FL (ref 37–54)
EOSINOPHIL # BLD AUTO: 0.03 10*3/MM3 (ref 0–0.7)
EOSINOPHIL NFR BLD AUTO: 0.4 % (ref 0–7)
ERYTHROCYTE [DISTWIDTH] IN BLOOD BY AUTOMATED COUNT: 12.7 % (ref 11.5–14.5)
ERYTHROCYTE [SEDIMENTATION RATE] IN BLOOD: 5 MM/HR (ref 0–20)
FOLATE SERPL-MCNC: 13.8 NG/ML
GFR SERPL CREATININE-BSD FRML MDRD: 135 ML/MIN/1.73
GLOBULIN UR ELPH-MCNC: 3.1 GM/DL
GLUCOSE BLD-MCNC: 87 MG/DL (ref 74–98)
HCT VFR BLD AUTO: 38.8 % (ref 37–47)
HDLC SERPL-MCNC: 90 MG/DL (ref 40–60)
HGB BLD-MCNC: 12.9 G/DL (ref 12–16)
IMM GRANULOCYTES # BLD: 0.04 10*3/MM3 (ref 0–0.06)
IMM GRANULOCYTES NFR BLD: 0.5 % (ref 0–0.6)
IRON 24H UR-MRATE: 203 MCG/DL (ref 37–181)
IRON SATN MFR SERPL: 54 % (ref 11–46)
LDLC SERPL CALC-MCNC: 67 MG/DL (ref 0–99)
LDLC/HDLC SERPL: 0.74 {RATIO}
LYMPHOCYTES # BLD AUTO: 1.85 10*3/MM3 (ref 0.6–3.4)
LYMPHOCYTES NFR BLD AUTO: 24.8 % (ref 10–50)
MAGNESIUM SERPL-MCNC: 1.8 MG/DL (ref 1.6–2.3)
MCH RBC QN AUTO: 29.1 PG (ref 27–31)
MCHC RBC AUTO-ENTMCNC: 33.2 G/DL (ref 30–37)
MCV RBC AUTO: 87.4 FL (ref 81–99)
MONOCYTES # BLD AUTO: 0.41 10*3/MM3 (ref 0–0.9)
MONOCYTES NFR BLD AUTO: 5.5 % (ref 0–12)
NEUTROPHILS # BLD AUTO: 5.09 10*3/MM3 (ref 2–6.9)
NEUTROPHILS NFR BLD AUTO: 68.3 % (ref 37–80)
NRBC BLD MANUAL-RTO: 0 /100 WBC (ref 0–0)
PLATELET # BLD AUTO: 242 10*3/MM3 (ref 130–400)
PMV BLD AUTO: 9.7 FL (ref 6–12)
POTASSIUM BLD-SCNC: 3.8 MMOL/L (ref 3.5–5.1)
PROT SERPL-MCNC: 7.5 G/DL (ref 6.3–8.2)
RBC # BLD AUTO: 4.44 10*6/MM3 (ref 4.2–5.4)
SODIUM BLD-SCNC: 140 MMOL/L (ref 137–145)
TIBC SERPL-MCNC: 373 MCG/DL (ref 261–497)
TRIGL SERPL-MCNC: 95 MG/DL
TSH SERPL DL<=0.05 MIU/L-ACNC: 1.73 MIU/ML (ref 0.47–4.68)
VIT B12 BLD-MCNC: 339 PG/ML (ref 239–931)
VLDLC SERPL-MCNC: 19 MG/DL
WBC NRBC COR # BLD: 7.46 10*3/MM3 (ref 4.8–10.8)

## 2018-05-17 PROCEDURE — 82607 VITAMIN B-12: CPT | Performed by: INTERNAL MEDICINE

## 2018-05-17 PROCEDURE — 86140 C-REACTIVE PROTEIN: CPT

## 2018-05-17 PROCEDURE — 80061 LIPID PANEL: CPT | Performed by: INTERNAL MEDICINE

## 2018-05-17 PROCEDURE — 84443 ASSAY THYROID STIM HORMONE: CPT | Performed by: INTERNAL MEDICINE

## 2018-05-17 PROCEDURE — 82306 VITAMIN D 25 HYDROXY: CPT | Performed by: INTERNAL MEDICINE

## 2018-05-17 PROCEDURE — 80053 COMPREHEN METABOLIC PANEL: CPT

## 2018-05-17 PROCEDURE — 83550 IRON BINDING TEST: CPT | Performed by: INTERNAL MEDICINE

## 2018-05-17 PROCEDURE — 83735 ASSAY OF MAGNESIUM: CPT | Performed by: INTERNAL MEDICINE

## 2018-05-17 PROCEDURE — 83540 ASSAY OF IRON: CPT | Performed by: INTERNAL MEDICINE

## 2018-05-17 PROCEDURE — 85025 COMPLETE CBC W/AUTO DIFF WBC: CPT

## 2018-05-17 PROCEDURE — 82746 ASSAY OF FOLIC ACID SERUM: CPT | Performed by: INTERNAL MEDICINE

## 2018-05-17 PROCEDURE — 85651 RBC SED RATE NONAUTOMATED: CPT

## 2018-05-17 PROCEDURE — 36415 COLL VENOUS BLD VENIPUNCTURE: CPT

## 2018-05-21 DIAGNOSIS — E83.19 IRON EXCESS: Primary | ICD-10-CM

## 2018-05-22 ENCOUNTER — LAB (OUTPATIENT)
Dept: LAB | Facility: HOSPITAL | Age: 43
End: 2018-05-22

## 2018-05-22 ENCOUNTER — HOSPITAL ENCOUNTER (OUTPATIENT)
Dept: MAMMOGRAPHY | Facility: HOSPITAL | Age: 43
Discharge: HOME | End: 2018-05-22
Admitting: INTERNAL MEDICINE

## 2018-05-22 DIAGNOSIS — Z12.31 ENCOUNTER FOR SCREENING MAMMOGRAM FOR MALIGNANT NEOPLASM OF BREAST: ICD-10-CM

## 2018-05-22 DIAGNOSIS — E83.19 IRON EXCESS: ICD-10-CM

## 2018-05-22 LAB — FERRITIN SERPL-MCNC: 22.5 NG/ML (ref 6.24–137)

## 2018-05-22 PROCEDURE — 81256 HFE GENE: CPT

## 2018-05-22 PROCEDURE — 77063 BREAST TOMOSYNTHESIS BI: CPT

## 2018-05-22 PROCEDURE — 82728 ASSAY OF FERRITIN: CPT | Performed by: INTERNAL MEDICINE

## 2018-05-22 PROCEDURE — 77067 SCR MAMMO BI INCL CAD: CPT

## 2018-05-22 PROCEDURE — 36415 COLL VENOUS BLD VENIPUNCTURE: CPT | Performed by: INTERNAL MEDICINE

## 2018-05-23 LAB — SPECIMEN STATUS: NORMAL

## 2018-05-24 ENCOUNTER — OFFICE VISIT (OUTPATIENT)
Dept: INTERNAL MEDICINE | Facility: CLINIC | Age: 43
End: 2018-05-24

## 2018-05-24 VITALS
HEART RATE: 98 BPM | RESPIRATION RATE: 14 BRPM | BODY MASS INDEX: 22.47 KG/M2 | WEIGHT: 119 LBS | SYSTOLIC BLOOD PRESSURE: 110 MMHG | DIASTOLIC BLOOD PRESSURE: 60 MMHG | OXYGEN SATURATION: 99 % | TEMPERATURE: 99 F | HEIGHT: 61 IN

## 2018-05-24 DIAGNOSIS — E83.19 IRON EXCESS: ICD-10-CM

## 2018-05-24 DIAGNOSIS — G25.81 RLS (RESTLESS LEGS SYNDROME): ICD-10-CM

## 2018-05-24 DIAGNOSIS — E28.2 POLYCYSTIC OVARIES: ICD-10-CM

## 2018-05-24 DIAGNOSIS — M54.2 NECK PAIN: ICD-10-CM

## 2018-05-24 DIAGNOSIS — N60.19 FIBROCYSTIC BREAST CHANGES, UNSPECIFIED LATERALITY: ICD-10-CM

## 2018-05-24 DIAGNOSIS — F41.9 ANXIETY: ICD-10-CM

## 2018-05-24 DIAGNOSIS — R79.0 ABNORMAL BLOOD LEVEL OF COPPER: ICD-10-CM

## 2018-05-24 DIAGNOSIS — E83.09 OTHER DISORDERS OF COPPER METABOLISM: ICD-10-CM

## 2018-05-24 DIAGNOSIS — M79.7 FIBROMYALGIA: ICD-10-CM

## 2018-05-24 DIAGNOSIS — G57.30 PERONEAL NEUROPATHY, UNSPECIFIED LATERALITY: ICD-10-CM

## 2018-05-24 DIAGNOSIS — I10 ESSENTIAL HYPERTENSION: Primary | ICD-10-CM

## 2018-05-24 DIAGNOSIS — R07.9 CHEST PAIN, UNSPECIFIED TYPE: ICD-10-CM

## 2018-05-24 DIAGNOSIS — K21.9 GASTROESOPHAGEAL REFLUX DISEASE, ESOPHAGITIS PRESENCE NOT SPECIFIED: ICD-10-CM

## 2018-05-24 DIAGNOSIS — M79.661 RIGHT CALF PAIN: ICD-10-CM

## 2018-05-24 DIAGNOSIS — G47.00 INSOMNIA, UNSPECIFIED TYPE: ICD-10-CM

## 2018-05-24 DIAGNOSIS — M79.10 MYALGIA: ICD-10-CM

## 2018-05-24 DIAGNOSIS — M19.90 ARTHRITIS: ICD-10-CM

## 2018-05-24 DIAGNOSIS — J45.909 UNCOMPLICATED ASTHMA, UNSPECIFIED ASTHMA SEVERITY, UNSPECIFIED WHETHER PERSISTENT: ICD-10-CM

## 2018-05-24 PROCEDURE — 93000 ELECTROCARDIOGRAM COMPLETE: CPT | Performed by: INTERNAL MEDICINE

## 2018-05-24 PROCEDURE — 99214 OFFICE O/P EST MOD 30 MIN: CPT | Performed by: INTERNAL MEDICINE

## 2018-05-24 NOTE — PROGRESS NOTES
Subjective   Honey Lama is a 43 y.o. female.     Chief Complaint   Patient presents with   • Pain     in back - left flank area        History of Present Illness   Patient workup this morning with left anterior chest pain.  Achy in nature denies any short of breath no lightheadedness no dizziness no palpitation no syncopal episode no diaphoresis nausea vomiting.  Patient had similar episode 3 years ago at that time patient had a stress test done was normal.  Patient's pain lasted a full several hours even now patient still has some low improved somewhat.  Today's temperature is 90.9.  Blood pressure stable.  Weight loss 11 pound over 6 months.  Patient had hernia surgery in October.  GERD stable.  Blood test that showed iron elevated but ferritin is normal.  Acne stable on medication.  Anxiety depression stable medication.  Patient does have some stress recently.  CRP repeated normal.  Vitamin D stable on supplement.    Current Outpatient Prescriptions:   •  albuterol (PROAIR RESPICLICK) 108 (90 BASE) MCG/ACT inhaler, Inhale 1 puff Every 4 (Four) Hours As Needed for Wheezing or Shortness of Air., Disp: 1 inhaler, Rfl: 0  •  amLODIPine (NORVASC) 10 MG tablet, Take 1 tablet by mouth Daily., Disp: 30 tablet, Rfl: 3  •  carvedilol (COREG) 12.5 MG tablet, Take 1 tablet by mouth twice daily, Disp: 90 tablet, Rfl: 3  •  Cetirizine HCl (ZYRTEC ALLERGY) 10 MG capsule, Take  by mouth Daily As Needed., Disp: , Rfl:   •  Cholecalciferol (VITAMIN D3) 2000 UNITS tablet, Take  by mouth Daily., Disp: , Rfl:   •  cyclobenzaprine (FLEXERIL) 5 MG tablet, Take 1 tablet by mouth 3 (Three) Times a Day As Needed for Muscle Spasms. (Patient taking differently: Take 5 mg by mouth At Night As Needed for Muscle Spasms.), Disp: 30 tablet, Rfl: 3  •  doxycycline (VIBRAMYCIN) 100 MG capsule, Take 1 capsule by mouth Daily., Disp: 30 capsule, Rfl: 5  •  DULoxetine (CYMBALTA) 60 MG capsule, Take 1 capsule by mouth Daily., Disp: 30 capsule,  Rfl: 4  •  levonorgestrel-ethinyl estradiol (JOLESSA) 0.15-0.03 MG per tablet, Take 1 tablet by mouth Daily., Disp: , Rfl:   •  lisdexamfetamine (VYVANSE) 30 MG capsule, 1 tab daily, Disp: 30 capsule, Rfl: 0  •  montelukast (SINGULAIR) 10 MG tablet, TAKE ONE TABLET BY MOUTH AT BEDTIME, Disp: 30 tablet, Rfl: 11  •  naproxen (NAPROSYN) 500 MG tablet, Take 1 tablet by mouth 2 (Two) Times a Day with food, Disp: 180 tablet, Rfl: 5  •  OXcarbazepine (TRILEPTAL) 300 MG tablet, Take 1 tablet by mouth every morning and take 2 tablets by mouth every night at bedtime, Disp: 270 tablet, Rfl: 1  •  pregabalin (LYRICA) 75 MG capsule, Take 1 capsule by mouth 2 (Two) Times a Day., Disp: 60 capsule, Rfl: 5  •  spironolactone (ALDACTONE) 25 MG tablet, Take 2 tablets by mouth Daily., Disp: 180 tablet, Rfl: 3    The following portions of the patient's history were reviewed and updated as appropriate: allergies, current medications, past family history, past medical history, past social history, past surgical history and problem list.    Review of Systems   Constitutional: Negative.    Respiratory: Negative.    Cardiovascular: Positive for chest pain. Negative for palpitations.   Gastrointestinal: Negative.    Genitourinary: Positive for flank pain. Negative for dysuria, frequency and urgency.   Musculoskeletal:        Chest wall pain   Skin: Negative.    Neurological: Negative.    Psychiatric/Behavioral: Negative.        Objective   Physical Exam   Constitutional: She is oriented to person, place, and time. She appears well-nourished.   Neck: Neck supple.   Cardiovascular: Normal rate, regular rhythm and normal heart sounds.    Chest wall tender   Pulmonary/Chest: Effort normal and breath sounds normal.   Abdominal: Bowel sounds are normal.   Musculoskeletal: She exhibits tenderness.   Neurological: She is alert and oriented to person, place, and time.   Skin: Skin is warm.   Psychiatric: She has a normal mood and affect.       All  tests have been reviewed.    Assessment/Plan   There are no diagnoses linked to this encounter.            HTN, continue medicine    High iron and saturation low normal ferritin will repeat    Muscle pain ? Fibro , neck and upper back do lab, ?PMR follow up rheum    Neck pain do PT    Atypical chest pain, reproducible with palpation, EKG for now    Right mid back pain and tender     Binge eating, resolved      acne, continue medicine      PCOS, on estrogen by gyn      fibromyalgia dx'd by rheum continue medicine      anxiety depression. 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 Probable rheum related but no other rheum disease per rheumatologist.     nikhil on med helps     FH colon ca, do colon GI rec wait for a few years     hCRP high hx , normal now. and cont ASA 81mg daily--     Right calf pain  with lying down, raise the head of the bed to see if helps. Do XR --     FH thyroid ca , hx of neck mass , us thyroid NSD.---    Right ribcage tender tylenol for now, call if no better          vacUTD  F/u depend on results  PE patient to schedule       ECG 12 Lead  Date/Time: 5/24/2018 2:01 PM  Performed by: KERRIE CAMPBELL  Authorized by: KERRIE CAMPBELL   Comparison: compared with previous ECG from 10/10/2017  Similar to previous ECG  Rhythm: sinus rhythm  Rate: normal  Conduction: conduction normal  ST Segments: ST segments normal  QRS axis: normal  Other: no other findings  Comments: [facility] [place]

## 2018-05-25 DIAGNOSIS — E83.09 OTHER DISORDERS OF COPPER METABOLISM: ICD-10-CM

## 2018-05-25 DIAGNOSIS — R82.90 URINE ABNORMALITY: Primary | ICD-10-CM

## 2018-05-25 DIAGNOSIS — M79.10 MYALGIA: ICD-10-CM

## 2018-05-25 LAB
APPEARANCE UR: CLEAR
BILIRUB UR QL STRIP: NEGATIVE
COLOR UR: YELLOW
GLUCOSE UR QL: NEGATIVE
HGB UR QL STRIP: NEGATIVE
KETONES UR QL STRIP: NEGATIVE
LEUKOCYTE ESTERASE UR QL STRIP: NEGATIVE
NITRITE UR QL STRIP: NEGATIVE
PH UR STRIP: 7 [PH] (ref 5–8)
PROT UR QL STRIP: NEGATIVE
SP GR UR: 1.02 (ref 1–1.03)
UROBILINOGEN UR STRIP-MCNC: NORMAL MG/DL

## 2018-05-28 ENCOUNTER — LAB (OUTPATIENT)
Dept: LAB | Facility: HOSPITAL | Age: 43
End: 2018-05-28

## 2018-05-28 ENCOUNTER — TRANSCRIBE ORDERS (OUTPATIENT)
Dept: INTERNAL MEDICINE | Facility: CLINIC | Age: 43
End: 2018-05-28

## 2018-05-28 DIAGNOSIS — E61.0 HYPOCUPREMIA: ICD-10-CM

## 2018-05-28 DIAGNOSIS — E83.19 IRON OVERLOAD: ICD-10-CM

## 2018-05-28 DIAGNOSIS — M79.10 MYALGIA: ICD-10-CM

## 2018-05-28 DIAGNOSIS — E61.0 HYPOCUPREMIA: Primary | ICD-10-CM

## 2018-05-28 LAB
CK SERPL-CCNC: 51 U/L (ref 30–170)
FERRITIN SERPL-MCNC: 22.7 NG/ML (ref 6.24–137)
IRON 24H UR-MRATE: 80 MCG/DL (ref 37–181)
IRON SATN MFR SERPL: 21 % (ref 11–46)
MYOGLOBIN SERPL-MCNC: 19.4 NG/ML (ref 0–101)
TIBC SERPL-MCNC: 388 MCG/DL (ref 261–497)

## 2018-05-28 PROCEDURE — 82728 ASSAY OF FERRITIN: CPT

## 2018-05-28 PROCEDURE — 83550 IRON BINDING TEST: CPT

## 2018-05-28 PROCEDURE — 82525 ASSAY OF COPPER: CPT

## 2018-05-28 PROCEDURE — 83874 ASSAY OF MYOGLOBIN: CPT

## 2018-05-28 PROCEDURE — 82550 ASSAY OF CK (CPK): CPT

## 2018-05-28 PROCEDURE — 82085 ASSAY OF ALDOLASE: CPT

## 2018-05-28 PROCEDURE — 83540 ASSAY OF IRON: CPT

## 2018-05-28 PROCEDURE — 82390 ASSAY OF CERULOPLASMIN: CPT

## 2018-05-28 PROCEDURE — 36415 COLL VENOUS BLD VENIPUNCTURE: CPT

## 2018-05-29 LAB
ALDOLASE SERPL-CCNC: 1.6 U/L (ref 3.3–10.3)
CERULOPLASMIN SERPL-MCNC: 44.2 MG/DL (ref 19–39)
HFE GENE MUT ANL BLD/T: NORMAL

## 2018-05-30 RX ORDER — AMITRIPTYLINE HYDROCHLORIDE 10 MG/1
10 TABLET, FILM COATED ORAL NIGHTLY
Qty: 30 TABLET | Refills: 0 | Status: SHIPPED | OUTPATIENT
Start: 2018-05-30 | End: 2018-06-04 | Stop reason: SDUPTHER

## 2018-06-01 DIAGNOSIS — R79.0 ABNORMAL BLOOD LEVEL OF COPPER: Primary | ICD-10-CM

## 2018-06-01 LAB — COPPER, FREE: 430 MCG/L

## 2018-06-04 RX ORDER — AMITRIPTYLINE HYDROCHLORIDE 10 MG/1
10 TABLET, FILM COATED ORAL NIGHTLY
Qty: 30 TABLET | Refills: 2 | Status: SHIPPED | OUTPATIENT
Start: 2018-06-04 | End: 2019-07-23 | Stop reason: SDUPTHER

## 2018-07-05 RX ORDER — DOXYCYCLINE HYCLATE 100 MG/1
100 CAPSULE ORAL DAILY
Qty: 90 CAPSULE | Refills: 3 | Status: SHIPPED | OUTPATIENT
Start: 2018-07-05 | End: 2019-08-07 | Stop reason: SDUPTHER

## 2018-07-18 ENCOUNTER — OFFICE VISIT (OUTPATIENT)
Dept: NEUROLOGY | Facility: CLINIC | Age: 43
End: 2018-07-18

## 2018-07-18 VITALS
HEART RATE: 81 BPM | SYSTOLIC BLOOD PRESSURE: 122 MMHG | OXYGEN SATURATION: 97 % | HEIGHT: 61 IN | DIASTOLIC BLOOD PRESSURE: 78 MMHG | BODY MASS INDEX: 22.28 KG/M2 | WEIGHT: 118 LBS

## 2018-07-18 DIAGNOSIS — R29.898 WEAKNESS OF BOTH LEGS: ICD-10-CM

## 2018-07-18 DIAGNOSIS — R29.898 WEAKNESS OF BOTH ARMS: Primary | ICD-10-CM

## 2018-07-18 PROCEDURE — 99214 OFFICE O/P EST MOD 30 MIN: CPT | Performed by: NURSE PRACTITIONER

## 2018-07-18 NOTE — PROGRESS NOTES
"Subjective   Honey Lama is a 43 y.o. female     Chief Complaint   Patient presents with   • Abnormal Lab     NP/Pt is here for an abnormal EMG. Pt has been seen by rheumatology.        History of Present Illness     Pt is a pleasant 43 yr old female in clinic to follow up EMG/NCV +States +  peroneal damage with right foot drop per pt \"for years\". Hx fibromyalgia and RA treated by rheumatology Bilat LE pain with tingling + excessive daytime sleepiness with Alma score 19. States had sleep study neg ROXANA.   Copper elevation and Rheumatology wanted neuro to follow up. Denies migraines. + Chronic fatigue + anxiety/depression + irritability States feels \"weighed down\" In extremities Pt with recent gastric bypass (this will help decrease absorption of Copper) +cervicalgia x years. Denies trauma resulting in cervicalgia.       The following portions of the patient's history were reviewed and updated as appropriate: history reviewed:20406::\"allergies\",\"current medications\",\"past family history\",\"past medical history\",\"past social history\",\"past surgical history\",\"problem list    Review of Systems   Constitutional: Negative for chills and fever.   HENT: Positive for postnasal drip and rhinorrhea. Negative for congestion, ear pain, hearing loss and sore throat.    Eyes: Negative for pain, discharge and redness.   Respiratory: Negative for cough, shortness of breath, wheezing and stridor.    Cardiovascular: Negative for chest pain, palpitations and leg swelling.   Gastrointestinal: Negative for abdominal pain, constipation, nausea and vomiting.   Endocrine: Negative for cold intolerance, heat intolerance and polyphagia.   Genitourinary: Negative for dysuria, flank pain, frequency and urgency.   Musculoskeletal: Positive for arthralgias, back pain, myalgias, neck pain and neck stiffness. Negative for joint swelling.   Skin: Negative for pallor, rash and wound.   Allergic/Immunologic: Negative for environmental " "allergies.   Neurological: Positive for weakness and numbness. Negative for dizziness, tremors, seizures, syncope, facial asymmetry, speech difficulty, light-headedness and headaches.   Hematological: Negative for adenopathy.   Psychiatric/Behavioral: Positive for agitation. Negative for confusion and hallucinations. The patient is nervous/anxious.        Objective         Vitals:    07/18/18 1143   BP: 122/78   Pulse: 81   SpO2: 97%   Weight: 53.5 kg (118 lb)   Height: 154.3 cm (60.75\")     GENERAL: Patient is pleasant, cooperative, appears to be stated age.  Body habitus is endomorphic.  HEAD:  Head is normocephalic and atraumatic.    NECK: Carotic upstrokes are 1+/4.  No cranial or cervical bruits.  The neck is supple with a full range of motion.   ENT: palate elevate symmetrically, no evidence of high arch palate, tongue midline erythema in posterior pharynx, Mallampati Classification Class II   CARDIOVASCULAR:  Regular rate and rhythm with normal S1 and S2 without rub or gallop.  RESPIRATORY:  Clear to auscultation without wheezes or crackle   PSYCH:  Higher cortical function/mental status:  The patient is alert.  She  is oriented x3 to time, place and person.  Recent and the remote memory appear normal.  The patient has a good fund of knowledge.  There is no visual or auditory hallucination or suicidal or homicidal ideation.  SPEECH:There is no gross evidence of aphasia, dysarthria or agnosia.      CRANIAL NERVES: Extraocular movements are full and smooth with normal pursuits and saccades.  No nystagmus noted.  The face is symmetric.   MOTOR: + left UE weakness with right LE weakness + right foot drop. Unable to lift right toe.  No involuntary movements noted.    DTR: are 3/4 and symmetrical in the upper extremities, 3/4 and symmetrical at the knees   SENSATION: decreased sensation bilat lower extremities light and sharp touch from mid shins down.  COORDINATION AND GAIT:  The patient walks with a right LE limp "  Romberg are negative.        Results for orders placed or performed in visit on 05/28/18   Copper, Free   Result Value Ref Range    Copper, Free 430 mcg/L   Aldolase   Result Value Ref Range    Aldolase 1.6 (L) 3.3 - 10.3 U/L   Myoglobin, Serum   Result Value Ref Range    Myoglobin 19.4 0.0 - 101.0 ng/mL   CK   Result Value Ref Range    Creatine Kinase 51 30 - 170 U/L   Iron Profile   Result Value Ref Range    Iron 80 37 - 181 mcg/dL    TIBC 388 261 - 497 mcg/dL    Iron Saturation 21 11 - 46 %   Ferritin   Result Value Ref Range    Ferritin 22.70 6.24 - 137.00 ng/mL   Ceruloplasmin   Result Value Ref Range    Ceruloplasmin 44.2 (H) 19.0 - 39.0 mg/dL       I have personally reviewed the above labs.    Assessment/Plan     1. Bilat LE neuropathy with deep peroneal damage with right foot drop: pt using lyrica through other clinic with some improvement. Declines PT    2. Elevated copper:  No copper ring noted on eye exam. ceroplasim elevated. Pt with recent gastric bypass which will assist in decreasing Copper. Pt aware no shell fish. Sched MRI brain    3. Cervicalgia with hyperreflexia: no known trauma. Sched MRI cervical spine    4. Fibromyalgia:  Continue with Rheumatology.

## 2018-07-19 ENCOUNTER — OFFICE VISIT (OUTPATIENT)
Dept: GASTROENTEROLOGY | Facility: CLINIC | Age: 43
End: 2018-07-19

## 2018-07-19 VITALS
HEART RATE: 84 BPM | BODY MASS INDEX: 22.62 KG/M2 | HEIGHT: 61 IN | DIASTOLIC BLOOD PRESSURE: 82 MMHG | SYSTOLIC BLOOD PRESSURE: 114 MMHG | OXYGEN SATURATION: 98 % | WEIGHT: 119.8 LBS | TEMPERATURE: 97.9 F

## 2018-07-19 DIAGNOSIS — R79.0 ABNORMAL BLOOD LEVEL OF COPPER: Primary | ICD-10-CM

## 2018-07-19 PROCEDURE — 99214 OFFICE O/P EST MOD 30 MIN: CPT | Performed by: INTERNAL MEDICINE

## 2018-07-19 NOTE — PROGRESS NOTES
PCP: Antonio Naylor MD    Chief Complaint   Patient presents with   • Abnormal Lab     increase melisa       History of Present Illness:   HPI  Mrs. Lama presents to the office for an opinion regarding elevated copper level in the blood and ceruloplasmin.  She denies any abdominal pain with meals.  The patient had a Nissen fundoplication surgery and is doing well from that standpoint.  She is being evaluated by neurology for issues with her reflexes.  The patient also is followed by rheumatology for fibromyalgia.  There is no family history of liver disease.  Mrs. Lama denies any history of elevated liver tests.  The patient has no issues with nausea or vomiting.  She does have some fatigue.  There is no history of change in the color of her eyes or skin.  The patient has some weight loss over the past 6-7 months.  Past Medical History:   Diagnosis Date   • Allergic rhinitis    • Anxiety and depression    • Arthritis    • Asthma    • Body piercing     BILATERAL EARS   • Bursitis of left hip     h/o   • Diarrhea    • Fibromyalgia    • GERD (gastroesophageal reflux disease)    • Hypercupremia    • Hypertension    • IBS (irritable bowel syndrome)    • Migraine    • Nausea with vomiting    • PCOS (polycystic ovarian syndrome)    • PONV (postoperative nausea and vomiting)    • Sore throat    • Strep pharyngitis    • Vitamin D deficiency        Past Surgical History:   Procedure Laterality Date   • BREAST SURGERY      biopsy   •  SECTION     • CHOLECYSTECTOMY     • CYSTECTOMY      pilonidal cyst resection   • NISSEN FUNDOPLICATION N/A 10/30/2017    Procedure: NISSEN FUNDOPLICATION LAPAROSCOPIC;  Surgeon: Megha Torres MD;  Location: [facility];  Service:    • OTHER SURGICAL HISTORY       History of Neuroplasty With Transposition Of Ulnar Nerve - At Elbow   • WISDOM TOOTH EXTRACTION           Current Outpatient Prescriptions:   •  albuterol (PROAIR RESPICLICK) 108 (90 BASE) MCG/ACT inhaler, Inhale 1 puff  Every 4 (Four) Hours As Needed for Wheezing or Shortness of Air., Disp: 1 inhaler, Rfl: 0  •  amitriptyline (ELAVIL) 10 MG tablet, Take 1 tablet by mouth Every Night., Disp: 30 tablet, Rfl: 2  •  amLODIPine (NORVASC) 10 MG tablet, Take 1 tablet by mouth Daily. (Patient taking differently: Take 5 mg by mouth Daily.), Disp: 30 tablet, Rfl: 3  •  carvedilol (COREG) 12.5 MG tablet, Take 1 tablet by mouth twice daily, Disp: 90 tablet, Rfl: 3  •  Cetirizine HCl (ZYRTEC ALLERGY) 10 MG capsule, Take  by mouth Daily As Needed., Disp: , Rfl:   •  Cholecalciferol (VITAMIN D3) 2000 UNITS tablet, Take  by mouth Daily., Disp: , Rfl:   •  cyclobenzaprine (FLEXERIL) 5 MG tablet, Take 1 tablet by mouth 3 (Three) Times a Day As Needed for Muscle Spasms. (Patient taking differently: Take 5 mg by mouth At Night As Needed for Muscle Spasms.), Disp: 30 tablet, Rfl: 3  •  doxycycline (VIBRAMYCIN) 100 MG capsule, Take 1 capsule by mouth Daily., Disp: 90 capsule, Rfl: 3  •  DULoxetine (CYMBALTA) 60 MG capsule, Take 1 capsule by mouth Daily., Disp: 30 capsule, Rfl: 4  •  hepatitis A (HAVRIX) 1440 EL U/ML vaccine, Inject as directed per protocol and repeat in 6 months, Disp: 1 mL, Rfl: 1  •  levonorgestrel-ethinyl estradiol (JOLESSA) 0.15-0.03 MG per tablet, Take 1 tablet by mouth Daily., Disp: , Rfl:   •  lisdexamfetamine (VYVANSE) 30 MG capsule, 1 tab daily, Disp: 30 capsule, Rfl: 0  •  montelukast (SINGULAIR) 10 MG tablet, TAKE ONE TABLET BY MOUTH AT BEDTIME, Disp: 30 tablet, Rfl: 11  •  naproxen (NAPROSYN) 500 MG tablet, Take 1 tablet by mouth 2 (Two) Times a Day with food, Disp: 180 tablet, Rfl: 5  •  norethindrone-ethinyl estradiol (MICROGESTIN 1/20) 1-20 MG-MCG per tablet, Take 1 tablet by mouth Daily., Disp: 63 tablet, Rfl: 3  •  OXcarbazepine (TRILEPTAL) 300 MG tablet, Take 1 tablet by mouth every morning and take 2 tablets by mouth every night at bedtime, Disp: 270 tablet, Rfl: 1  •  pregabalin (LYRICA) 75 MG capsule, Take 1 capsule  by mouth 2 (Two) Times a Day., Disp: 60 capsule, Rfl: 5  •  spironolactone (ALDACTONE) 25 MG tablet, Take 2 tablets by mouth Daily., Disp: 180 tablet, Rfl: 3    Allergies   Allergen Reactions   • Minocycline    • Nifedipine Other (See Comments)     Chest discomfort, headache, nausea   • Rifampin    • Sulfamethoxazole-Trimethoprim        Family History   Problem Relation Age of Onset   • Arthritis Father    • Diabetes Father    • Hyperlipidemia Father    • Hypertension Father    • Kidney disease Father    • ABBE disease Father    • Thyroid cancer Father    • Cancer Maternal Aunt         Lung   • Cancer Maternal Uncle         Lung   • Stroke Paternal Aunt    • Alzheimer's disease Maternal Grandmother    • Stroke Maternal Grandmother    • Other Maternal Grandfather         pacemaker   • Arthritis Paternal Grandmother    • Hypertension Paternal Grandmother    • Heart attack Paternal Grandfather    • Alzheimer's disease Paternal Grandfather    • Arthritis Paternal Grandfather    • Hypertension Paternal Grandfather    • Cancer Other    • Colon cancer Cousin        Social History     Social History   • Marital status:      Spouse name: N/A   • Number of children: N/A   • Years of education: N/A     Occupational History   • Not on file.     Social History Main Topics   • Smoking status: Never Smoker   • Smokeless tobacco: Never Used   • Alcohol use Yes      Comment: ocassionally   • Drug use: No      Comment: naproxen   • Sexual activity: Defer     Other Topics Concern   • Not on file     Social History Narrative   • No narrative on file       Review of Systems   Constitutional: Positive for fatigue and unexpected weight change (loss). Negative for activity change, appetite change and fever.   HENT: Negative for dental problem, hearing loss, mouth sores, postnasal drip, sneezing, trouble swallowing and voice change.    Eyes: Negative for pain, redness, itching and visual disturbance.   Respiratory: Positive for cough.  Negative for choking, chest tightness, shortness of breath and wheezing.    Cardiovascular: Negative for chest pain, palpitations and leg swelling.   Gastrointestinal: Negative for abdominal distention, abdominal pain, anal bleeding, blood in stool, constipation, diarrhea, nausea, rectal pain and vomiting.   Endocrine: Negative for cold intolerance, heat intolerance, polydipsia, polyphagia and polyuria.   Genitourinary: Negative for difficulty urinating, dysuria, enuresis, flank pain, hematuria and urgency.   Musculoskeletal: Negative for arthralgias, back pain, gait problem, joint swelling and myalgias.        Joint pain   Skin: Negative for color change, pallor and rash.   Allergic/Immunologic: Negative for environmental allergies, food allergies and immunocompromised state.   Neurological: Negative for dizziness, tremors, seizures, facial asymmetry, speech difficulty, numbness and headaches.   Hematological: Negative for adenopathy.   Psychiatric/Behavioral: Negative for behavioral problems, confusion, dysphoric mood, hallucinations and self-injury.       Vitals:    07/19/18 0853   BP: 114/82   Pulse: 84   Temp: 97.9 °F (36.6 °C)   SpO2: 98%       Physical Exam   Constitutional: She is oriented to person, place, and time. She appears well-nourished. No distress.   HENT:   Head: Normocephalic and atraumatic.   Mouth/Throat: Oropharynx is clear and moist. No oropharyngeal exudate.   Eyes: EOM are normal. No scleral icterus.   Neck: Neck supple. No thyromegaly present.   Cardiovascular: Normal rate, regular rhythm and normal heart sounds.  Exam reveals no gallop.    No murmur heard.  Pulmonary/Chest: Effort normal and breath sounds normal. She has no wheezes. She has no rales.   Abdominal: Soft. Bowel sounds are normal. There is no tenderness. There is no rebound and no guarding.   Musculoskeletal: Normal range of motion. She exhibits no edema or tenderness.   Lymphadenopathy:     She has no cervical adenopathy.    Neurological: She is alert and oriented to person, place, and time. She exhibits normal muscle tone.   No muscle rigidity, no tremor   Skin: Skin is dry. No rash noted. No erythema.   No palmar erythema, no spider nevi   Psychiatric: She has a normal mood and affect. Her behavior is normal. Thought content normal.   Vitals reviewed.      Honey was seen today for abnormal lab.    Diagnoses and all orders for this visit:    Abnormal blood level of copper    The patient has an elevated ceruloplasmin and copper level.  The alkaline phosphatase is normal as well as the transaminases.  The laboratory data is not consistent with Judson's disease.  She reportedly had an evaluation in 2016 with a 24 urine  that was normal.      Plan: Will follow up in the office in one year.  No indication at this time for liver biopsy or 24-hour urine.    I spent over 50% of the office visit counseling and answering questions from the patient.

## 2018-08-07 ENCOUNTER — TELEPHONE (OUTPATIENT)
Dept: NEUROLOGY | Facility: CLINIC | Age: 43
End: 2018-08-07

## 2018-08-21 ENCOUNTER — TELEPHONE (OUTPATIENT)
Dept: NEUROLOGY | Facility: CLINIC | Age: 43
End: 2018-08-21

## 2018-09-04 DIAGNOSIS — M54.2 CERVICALGIA: Primary | ICD-10-CM

## 2018-09-04 RX ORDER — CYCLOBENZAPRINE HCL 5 MG
5 TABLET ORAL 3 TIMES DAILY PRN
Qty: 90 TABLET | Refills: 3 | Status: SHIPPED | OUTPATIENT
Start: 2018-09-04 | End: 2019-06-24

## 2018-09-04 RX ORDER — CARVEDILOL 12.5 MG/1
TABLET ORAL
Qty: 90 TABLET | Refills: 3 | Status: SHIPPED | OUTPATIENT
Start: 2018-09-04 | End: 2019-03-04 | Stop reason: SDUPTHER

## 2018-09-18 DIAGNOSIS — G89.29 CHRONIC INTRACTABLE HEADACHE, UNSPECIFIED HEADACHE TYPE: Primary | ICD-10-CM

## 2018-09-18 DIAGNOSIS — R51.9 CHRONIC INTRACTABLE HEADACHE, UNSPECIFIED HEADACHE TYPE: Primary | ICD-10-CM

## 2018-09-25 ENCOUNTER — HOSPITAL ENCOUNTER (OUTPATIENT)
Dept: CT IMAGING | Facility: HOSPITAL | Age: 43
Discharge: HOME | End: 2018-09-25
Admitting: NURSE PRACTITIONER

## 2018-09-25 DIAGNOSIS — M54.2 CERVICALGIA: ICD-10-CM

## 2018-09-25 PROCEDURE — 72125 CT NECK SPINE W/O DYE: CPT

## 2018-10-01 RX ORDER — MONTELUKAST SODIUM 10 MG/1
TABLET ORAL
Qty: 30 TABLET | Refills: 5 | Status: SHIPPED | OUTPATIENT
Start: 2018-10-01 | End: 2019-05-31 | Stop reason: SDUPTHER

## 2018-10-11 ENCOUNTER — TELEPHONE (OUTPATIENT)
Dept: INTERNAL MEDICINE | Facility: CLINIC | Age: 43
End: 2018-10-11

## 2018-10-31 ENCOUNTER — OFFICE VISIT (OUTPATIENT)
Dept: NEUROLOGY | Facility: CLINIC | Age: 43
End: 2018-10-31

## 2018-10-31 VITALS
WEIGHT: 119 LBS | DIASTOLIC BLOOD PRESSURE: 72 MMHG | BODY MASS INDEX: 23.36 KG/M2 | OXYGEN SATURATION: 98 % | HEIGHT: 60 IN | HEART RATE: 71 BPM | SYSTOLIC BLOOD PRESSURE: 126 MMHG

## 2018-10-31 DIAGNOSIS — G57.30 PERONEAL NEUROPATHY, UNSPECIFIED LATERALITY: Primary | ICD-10-CM

## 2018-10-31 DIAGNOSIS — R79.0 ABNORMAL BLOOD LEVEL OF COPPER: ICD-10-CM

## 2018-10-31 PROCEDURE — 99214 OFFICE O/P EST MOD 30 MIN: CPT | Performed by: NURSE PRACTITIONER

## 2018-10-31 RX ORDER — TIZANIDINE 4 MG/1
4 TABLET ORAL 3 TIMES DAILY
Qty: 90 TABLET | Refills: 1 | Status: SHIPPED | OUTPATIENT
Start: 2018-10-31 | End: 2019-10-31

## 2018-11-06 ENCOUNTER — TELEPHONE (OUTPATIENT)
Dept: INTERNAL MEDICINE | Facility: CLINIC | Age: 43
End: 2018-11-06

## 2018-11-07 RX ORDER — DEXTROAMPHETAMINE SACCHARATE, AMPHETAMINE ASPARTATE, DEXTROAMPHETAMINE SULFATE AND AMPHETAMINE SULFATE 2.5; 2.5; 2.5; 2.5 MG/1; MG/1; MG/1; MG/1
10 TABLET ORAL 2 TIMES DAILY
Qty: 60 TABLET | Refills: 0 | Status: SHIPPED | OUTPATIENT
Start: 2018-11-07 | End: 2018-11-07 | Stop reason: SDUPTHER

## 2018-11-07 RX ORDER — DEXTROAMPHETAMINE SACCHARATE, AMPHETAMINE ASPARTATE, DEXTROAMPHETAMINE SULFATE AND AMPHETAMINE SULFATE 2.5; 2.5; 2.5; 2.5 MG/1; MG/1; MG/1; MG/1
10 TABLET ORAL 2 TIMES DAILY
Qty: 60 TABLET | Refills: 0 | Status: SHIPPED | OUTPATIENT
Start: 2018-11-07 | End: 2018-11-15

## 2019-02-28 ENCOUNTER — PRIOR AUTHORIZATION (OUTPATIENT)
Dept: INTERNAL MEDICINE | Facility: CLINIC | Age: 44
End: 2019-02-28

## 2019-03-04 RX ORDER — CARVEDILOL 12.5 MG/1
TABLET ORAL
Qty: 60 TABLET | Refills: 3 | Status: SHIPPED | OUTPATIENT
Start: 2019-03-04 | End: 2019-09-03 | Stop reason: SDUPTHER

## 2019-03-11 RX ORDER — SPIRONOLACTONE 25 MG/1
50 TABLET ORAL DAILY
Qty: 180 TABLET | Refills: 3 | Status: SHIPPED | OUTPATIENT
Start: 2019-03-11 | End: 2019-09-05 | Stop reason: SDUPTHER

## 2019-05-31 RX ORDER — MONTELUKAST SODIUM 10 MG/1
TABLET ORAL
Qty: 90 TABLET | Refills: 0 | Status: SHIPPED | OUTPATIENT
Start: 2019-05-31 | End: 2019-09-09 | Stop reason: SDUPTHER

## 2019-06-06 ENCOUNTER — PATIENT MESSAGE (OUTPATIENT)
Dept: INTERNAL MEDICINE | Facility: CLINIC | Age: 44
End: 2019-06-06

## 2019-06-24 ENCOUNTER — OFFICE VISIT (OUTPATIENT)
Dept: INTERNAL MEDICINE | Facility: CLINIC | Age: 44
End: 2019-06-24

## 2019-06-24 VITALS
BODY MASS INDEX: 25.6 KG/M2 | TEMPERATURE: 98.8 F | DIASTOLIC BLOOD PRESSURE: 80 MMHG | HEART RATE: 97 BPM | WEIGHT: 130.4 LBS | SYSTOLIC BLOOD PRESSURE: 118 MMHG | OXYGEN SATURATION: 99 % | HEIGHT: 60 IN

## 2019-06-24 DIAGNOSIS — E07.9 THYROID MASS: ICD-10-CM

## 2019-06-24 DIAGNOSIS — G57.30 PERONEAL NEUROPATHY, UNSPECIFIED LATERALITY: ICD-10-CM

## 2019-06-24 DIAGNOSIS — N60.19 FIBROCYSTIC BREAST CHANGES, UNSPECIFIED LATERALITY: ICD-10-CM

## 2019-06-24 DIAGNOSIS — F41.9 ANXIETY: ICD-10-CM

## 2019-06-24 DIAGNOSIS — F50.81 BINGE EATING DISORDER: ICD-10-CM

## 2019-06-24 DIAGNOSIS — G47.00 INSOMNIA, UNSPECIFIED TYPE: ICD-10-CM

## 2019-06-24 DIAGNOSIS — K21.9 GASTROESOPHAGEAL REFLUX DISEASE, ESOPHAGITIS PRESENCE NOT SPECIFIED: ICD-10-CM

## 2019-06-24 DIAGNOSIS — Z98.890 HISTORY OF NISSEN FUNDOPLICATION: ICD-10-CM

## 2019-06-24 DIAGNOSIS — Z23 NEED FOR HEPATITIS A VACCINATION: ICD-10-CM

## 2019-06-24 DIAGNOSIS — E28.2 POLYCYSTIC OVARIES: ICD-10-CM

## 2019-06-24 DIAGNOSIS — E16.2 HYPOGLYCEMIA: ICD-10-CM

## 2019-06-24 DIAGNOSIS — M79.7 FIBROMYALGIA: ICD-10-CM

## 2019-06-24 DIAGNOSIS — R79.0 ABNORMAL BLOOD LEVEL OF COPPER: ICD-10-CM

## 2019-06-24 DIAGNOSIS — M79.661 RIGHT CALF PAIN: ICD-10-CM

## 2019-06-24 DIAGNOSIS — I10 ESSENTIAL HYPERTENSION: Primary | ICD-10-CM

## 2019-06-24 DIAGNOSIS — M19.90 ARTHRITIS: ICD-10-CM

## 2019-06-24 DIAGNOSIS — J45.909 UNCOMPLICATED ASTHMA, UNSPECIFIED ASTHMA SEVERITY, UNSPECIFIED WHETHER PERSISTENT: ICD-10-CM

## 2019-06-24 DIAGNOSIS — Z86.39 HISTORY OF THYROID DISEASE: ICD-10-CM

## 2019-06-24 PROCEDURE — 90471 IMMUNIZATION ADMIN: CPT | Performed by: INTERNAL MEDICINE

## 2019-06-24 PROCEDURE — 99396 PREV VISIT EST AGE 40-64: CPT | Performed by: INTERNAL MEDICINE

## 2019-06-24 PROCEDURE — 90632 HEPA VACCINE ADULT IM: CPT | Performed by: INTERNAL MEDICINE

## 2019-06-24 RX ORDER — PREGABALIN 100 MG/1
100 CAPSULE ORAL 2 TIMES DAILY
COMMUNITY
End: 2020-01-08 | Stop reason: SDUPTHER

## 2019-06-24 RX ORDER — ZINC GLUCONATE 50 MG
1 TABLET ORAL DAILY
COMMUNITY

## 2019-06-24 RX ORDER — DULOXETIN HYDROCHLORIDE 60 MG/1
60 CAPSULE, DELAYED RELEASE ORAL 2 TIMES DAILY
Qty: 60 CAPSULE | Refills: 3 | Status: SHIPPED | OUTPATIENT
Start: 2019-06-24 | End: 2020-01-15 | Stop reason: SDUPTHER

## 2019-06-24 RX ORDER — DULOXETIN HYDROCHLORIDE 60 MG/1
60 CAPSULE, DELAYED RELEASE ORAL 2 TIMES DAILY
Qty: 30 CAPSULE | Refills: 3 | Status: SHIPPED | OUTPATIENT
Start: 2019-06-24 | End: 2019-06-24

## 2019-06-24 RX ORDER — RANITIDINE 150 MG/1
150 TABLET ORAL DAILY
COMMUNITY
End: 2020-01-08

## 2019-06-24 NOTE — PROGRESS NOTES
Subjective   Honey Lama is a 44 y.o. female and is here for a comprehensive physical exam.   Patient here for physical also has medical problems to be addressed.  Iron return to normal.  Patient still has fibromyalgia pain especially in the neck.  Atypical chest pain resolved.  Patient still has lower back pain.  Still have anxious nervousness.  Do you take any herbs or supplements that were not prescribed by a doctor? no  Are you taking calcium supplements? no  Are you taking aspirin daily? no      The following portions of the patient's history were reviewed and updated as appropriate: allergies, current medications, past family history, past medical history, past social history, past surgical history and problem list.      Review of Systems   Constitutional: Negative.    HENT: Negative.    Eyes: Negative.    Respiratory: Negative.    Cardiovascular: Negative.    Gastrointestinal: Negative.    Endocrine: Negative.    Genitourinary: Negative.    Musculoskeletal: Positive for arthralgias and myalgias.   Skin: Negative.    Allergic/Immunologic: Negative.    Neurological: Negative.    Hematological: Negative.    Psychiatric/Behavioral: The patient is nervous/anxious.          Physical Exam   Constitutional: She is oriented to person, place, and time. She appears well-developed and well-nourished.   HENT:   Head: Normocephalic and atraumatic.   Right Ear: External ear normal.   Left Ear: External ear normal.   Nose: Nose normal.   Mouth/Throat: Oropharynx is clear and moist.   Eyes: Conjunctivae and EOM are normal. Pupils are equal, round, and reactive to light.   Neck: Normal range of motion. Neck supple.   Cardiovascular: Normal rate, regular rhythm, normal heart sounds and intact distal pulses.   Pulmonary/Chest: Effort normal and breath sounds normal.   Abdominal: Soft. Bowel sounds are normal.   Genitourinary: Vagina normal and uterus normal.   Musculoskeletal: Normal range of motion. She exhibits  tenderness.   Neurological: She is alert and oriented to person, place, and time. She has normal reflexes.   Skin: Skin is warm and dry.   Psychiatric: She has a normal mood and affect. Her behavior is normal. Judgment and thought content normal.       All  tests have been reviewed.    Assessment/Plan          1. Patient Counseling:  --Nutrition: Stressed importance of moderation in sodium/caffeine intake, saturated fat and cholesterol, caloric balance, sufficient intake of fresh fruits, vegetables, fiber, calcium and iron.  --Exercise: Stressed the importance of regular exercise.   --Injury prevention: Discussed safety belts, safety helmets, smoke detector, smoking near bedding or upholstery.   --Dental health: Discussed importance of regular tooth brushing, flossing, and dental visits.  --Immunizations reviewed.  --Discussed benefits of screening colonoscopy.  --After hours service discussed with patient    2. Discussed the patient's BMI with her.              HTN, continue medicine     High iron and saturation and ferritin are all normal     Neck pain declines PT     Atypical chest pain, resolved  EKG for now     Right mid back pain and tender     Binge eating, resolved      acne, continue medicine      PCOS, on estrogen by gyn, gyn exam by gyn      fibromyalgia dx'd by rheum continue medicine      anxiety depression. continue medicine, psych f/u, increase cymbalta to bid.      GERD improved after HH surgery      asthma continue medicine prn      CRP repeat normal      peroneal n neuropathy lalitha left toe and right toe and ankle, continue med     Vit D low normal now, continue supplement     ceruloplasmin high copper high,, repeat  seen by GI [facility] no liver prob. Probable rheum related but no other rheum disease per rheumatologist. Patient is self taking zinc      raynauld on med helps     FH colon ca, do colon GI next year     hCRP high hx , normal now. Need to resume ASA     Right calf pain  with lying down, raise  the head of the bed to see if helps. XR NSD     FH thyroid ca , hx of neck mass , us thyroid NSD.patient wants to repeat --    Annual PE

## 2019-06-25 ENCOUNTER — TELEPHONE (OUTPATIENT)
Dept: INTERNAL MEDICINE | Facility: CLINIC | Age: 44
End: 2019-06-25

## 2019-06-25 NOTE — TELEPHONE ENCOUNTER
Pharmacy needs clarification on duloxetine 60 mg is it once a day or twice a day?     Please advise

## 2019-07-03 ENCOUNTER — APPOINTMENT (OUTPATIENT)
Dept: LAB | Facility: HOSPITAL | Age: 44
End: 2019-07-03

## 2019-07-03 LAB
25(OH)D3 SERPL-MCNC: 39.2 NG/ML
ALBUMIN SERPL-MCNC: 4.2 G/DL (ref 3.5–5)
ALBUMIN/GLOB SERPL: 1.4 G/DL (ref 1–2)
ALP SERPL-CCNC: 68 U/L (ref 38–126)
ALT SERPL W P-5'-P-CCNC: 17 U/L (ref 13–69)
ANION GAP SERPL CALCULATED.3IONS-SCNC: 11.3 MMOL/L (ref 10–20)
AST SERPL-CCNC: 24 U/L (ref 15–46)
BASOPHILS # BLD AUTO: 0.04 10*3/MM3 (ref 0–0.2)
BASOPHILS NFR BLD AUTO: 0.6 % (ref 0–1.5)
BILIRUB SERPL-MCNC: 0.3 MG/DL (ref 0.2–1.3)
BILIRUB UR QL STRIP: NEGATIVE
BUN BLD-MCNC: 19 MG/DL (ref 7–20)
BUN/CREAT SERPL: 38 (ref 7.1–23.5)
CALCIUM SPEC-SCNC: 9 MG/DL (ref 8.4–10.2)
CHLORIDE SERPL-SCNC: 100 MMOL/L (ref 98–107)
CHOLEST SERPL-MCNC: 171 MG/DL (ref 0–199)
CLARITY UR: CLEAR
CO2 SERPL-SCNC: 31 MMOL/L (ref 26–30)
COLOR UR: YELLOW
CREAT BLD-MCNC: 0.5 MG/DL (ref 0.6–1.3)
DEPRECATED RDW RBC AUTO: 42.5 FL (ref 37–54)
EOSINOPHIL # BLD AUTO: 0.03 10*3/MM3 (ref 0–0.4)
EOSINOPHIL NFR BLD AUTO: 0.5 % (ref 0.3–6.2)
ERYTHROCYTE [DISTWIDTH] IN BLOOD BY AUTOMATED COUNT: 12.8 % (ref 12.3–15.4)
FERRITIN SERPL-MCNC: 14.9 NG/ML (ref 6.24–137)
FOLATE SERPL-MCNC: 12.6 NG/ML
GFR SERPL CREATININE-BSD FRML MDRD: 134 ML/MIN/1.73
GLOBULIN UR ELPH-MCNC: 3.1 GM/DL
GLUCOSE BLD-MCNC: 89 MG/DL (ref 74–98)
GLUCOSE UR STRIP-MCNC: NEGATIVE MG/DL
HBA1C MFR BLD: 5 % (ref 3–6)
HCT VFR BLD AUTO: 38.8 % (ref 34–46.6)
HDLC SERPL-MCNC: 106 MG/DL (ref 40–60)
HGB BLD-MCNC: 12.8 G/DL (ref 12–15.9)
HGB UR QL STRIP.AUTO: NEGATIVE
HIGH SENSITIVE C-REACTIVE PROTEIN MG/L: 5.76 MG/L (ref 1–3)
IMM GRANULOCYTES # BLD AUTO: 0.02 10*3/MM3 (ref 0–0.05)
IMM GRANULOCYTES NFR BLD AUTO: 0.3 % (ref 0–0.5)
IRON 24H UR-MRATE: 109 MCG/DL (ref 37–181)
IRON SATN MFR SERPL: 27 % (ref 11–46)
KETONES UR QL STRIP: NEGATIVE
LDLC SERPL CALC-MCNC: 55 MG/DL (ref 0–99)
LDLC/HDLC SERPL: 0.52 {RATIO}
LEUKOCYTE ESTERASE UR QL STRIP.AUTO: NEGATIVE
LYMPHOCYTES # BLD AUTO: 1.7 10*3/MM3 (ref 0.7–3.1)
LYMPHOCYTES NFR BLD AUTO: 27.5 % (ref 19.6–45.3)
MCH RBC QN AUTO: 29.6 PG (ref 26.6–33)
MCHC RBC AUTO-ENTMCNC: 33 G/DL (ref 31.5–35.7)
MCV RBC AUTO: 89.6 FL (ref 79–97)
MONOCYTES # BLD AUTO: 0.48 10*3/MM3 (ref 0.1–0.9)
MONOCYTES NFR BLD AUTO: 7.8 % (ref 5–12)
NEUTROPHILS # BLD AUTO: 3.91 10*3/MM3 (ref 1.7–7)
NEUTROPHILS NFR BLD AUTO: 63.3 % (ref 42.7–76)
NITRITE UR QL STRIP: NEGATIVE
NRBC BLD AUTO-RTO: 0 /100 WBC (ref 0–0.2)
PH UR STRIP.AUTO: 5.5 [PH] (ref 5–8)
PLATELET # BLD AUTO: 254 10*3/MM3 (ref 140–450)
PMV BLD AUTO: 9 FL (ref 6–12)
POTASSIUM BLD-SCNC: 4.3 MMOL/L (ref 3.5–5.1)
PROT SERPL-MCNC: 7.3 G/DL (ref 6.3–8.2)
PROT UR QL STRIP: NEGATIVE
RBC # BLD AUTO: 4.33 10*6/MM3 (ref 3.77–5.28)
SODIUM BLD-SCNC: 138 MMOL/L (ref 137–145)
SP GR UR STRIP: 1.03 (ref 1–1.03)
TIBC SERPL-MCNC: 398 MCG/DL (ref 261–497)
TRIGL SERPL-MCNC: 50 MG/DL
TSH SERPL DL<=0.05 MIU/L-ACNC: 1.77 MIU/ML (ref 0.47–4.68)
UROBILINOGEN UR QL STRIP: NORMAL
VIT B12 BLD-MCNC: 258 PG/ML (ref 239–931)
VLDLC SERPL-MCNC: 10 MG/DL
WBC NRBC COR # BLD: 6.18 10*3/MM3 (ref 3.4–10.8)

## 2019-07-03 PROCEDURE — 84443 ASSAY THYROID STIM HORMONE: CPT | Performed by: INTERNAL MEDICINE

## 2019-07-03 PROCEDURE — 85025 COMPLETE CBC W/AUTO DIFF WBC: CPT | Performed by: INTERNAL MEDICINE

## 2019-07-03 PROCEDURE — 83550 IRON BINDING TEST: CPT | Performed by: INTERNAL MEDICINE

## 2019-07-03 PROCEDURE — 83540 ASSAY OF IRON: CPT | Performed by: INTERNAL MEDICINE

## 2019-07-03 PROCEDURE — 81003 URINALYSIS AUTO W/O SCOPE: CPT | Performed by: INTERNAL MEDICINE

## 2019-07-03 PROCEDURE — 82306 VITAMIN D 25 HYDROXY: CPT | Performed by: INTERNAL MEDICINE

## 2019-07-03 PROCEDURE — 82390 ASSAY OF CERULOPLASMIN: CPT | Performed by: INTERNAL MEDICINE

## 2019-07-03 PROCEDURE — 82525 ASSAY OF COPPER: CPT | Performed by: INTERNAL MEDICINE

## 2019-07-03 PROCEDURE — 82728 ASSAY OF FERRITIN: CPT | Performed by: INTERNAL MEDICINE

## 2019-07-03 PROCEDURE — 80053 COMPREHEN METABOLIC PANEL: CPT | Performed by: INTERNAL MEDICINE

## 2019-07-03 PROCEDURE — 82746 ASSAY OF FOLIC ACID SERUM: CPT | Performed by: INTERNAL MEDICINE

## 2019-07-03 PROCEDURE — 80061 LIPID PANEL: CPT | Performed by: INTERNAL MEDICINE

## 2019-07-03 PROCEDURE — 83036 HEMOGLOBIN GLYCOSYLATED A1C: CPT | Performed by: INTERNAL MEDICINE

## 2019-07-03 PROCEDURE — 82607 VITAMIN B-12: CPT | Performed by: INTERNAL MEDICINE

## 2019-07-03 PROCEDURE — 36415 COLL VENOUS BLD VENIPUNCTURE: CPT | Performed by: INTERNAL MEDICINE

## 2019-07-03 PROCEDURE — 86141 C-REACTIVE PROTEIN HS: CPT | Performed by: INTERNAL MEDICINE

## 2019-07-04 LAB — CERULOPLASMIN SERPL-MCNC: 42.9 MG/DL (ref 19–39)

## 2019-07-08 ENCOUNTER — PATIENT MESSAGE (OUTPATIENT)
Dept: INTERNAL MEDICINE | Facility: CLINIC | Age: 44
End: 2019-07-08

## 2019-07-14 DIAGNOSIS — R78.79 HIGH BLOOD COPPER LEVEL: Primary | ICD-10-CM

## 2019-07-17 ENCOUNTER — TRANSCRIBE ORDERS (OUTPATIENT)
Dept: ADMINISTRATIVE | Facility: HOSPITAL | Age: 44
End: 2019-07-17

## 2019-07-17 DIAGNOSIS — Z12.39 SCREENING BREAST EXAMINATION: Primary | ICD-10-CM

## 2019-07-17 LAB — COPPER, FREE: 590 MCG/L

## 2019-07-24 RX ORDER — AMITRIPTYLINE HYDROCHLORIDE 10 MG/1
10 TABLET, FILM COATED ORAL NIGHTLY
Qty: 30 TABLET | Refills: 2 | Status: SHIPPED | OUTPATIENT
Start: 2019-07-24 | End: 2020-01-08

## 2019-08-07 RX ORDER — DOXYCYCLINE HYCLATE 100 MG/1
100 CAPSULE ORAL DAILY
Qty: 90 CAPSULE | Refills: 3 | Status: SHIPPED | OUTPATIENT
Start: 2019-08-07 | End: 2020-09-03 | Stop reason: SDUPTHER

## 2019-08-07 NOTE — TELEPHONE ENCOUNTER
From: Honey Lama  To: Antonio Naylor MD  Sent: 6/6/2019 8:02 AM EDT  Subject: Prescription Question    Hi... I need a refill on Vyvanse to [facility].     Thanks,    Honey

## 2019-08-29 DIAGNOSIS — Z00.00 ENCOUNTER FOR PREVENTIVE CARE: Primary | ICD-10-CM

## 2019-09-04 RX ORDER — CARVEDILOL 12.5 MG/1
TABLET ORAL
Qty: 60 TABLET | Refills: 3 | Status: SHIPPED | OUTPATIENT
Start: 2019-09-04 | End: 2019-09-05 | Stop reason: SDUPTHER

## 2019-09-05 ENCOUNTER — PATIENT MESSAGE (OUTPATIENT)
Dept: INTERNAL MEDICINE | Facility: CLINIC | Age: 44
End: 2019-09-05

## 2019-09-05 RX ORDER — SPIRONOLACTONE 25 MG/1
50 TABLET ORAL DAILY
Qty: 180 TABLET | Refills: 3 | Status: SHIPPED | OUTPATIENT
Start: 2019-09-05 | End: 2020-11-03 | Stop reason: SDUPTHER

## 2019-09-05 RX ORDER — CARVEDILOL 12.5 MG/1
12.5 TABLET ORAL 2 TIMES DAILY
Qty: 60 TABLET | Refills: 3 | Status: SHIPPED | OUTPATIENT
Start: 2019-09-05 | End: 2020-05-11 | Stop reason: ALTCHOICE

## 2019-09-05 NOTE — TELEPHONE ENCOUNTER
From: Honey Lama  To: Antonio Naylor MD  Sent: 9/5/2019 7:09 AM EDT  Subject: Prescription Question    Hi...  I need refills on carvedilol, spironolactone and Vyvanse to [facility].     ThanksNan Méndez  ----- Message -----  From: TRI CORRAL  Sent: 8/7/2019 8:45 AM EDT  To: Honey Lama  Subject: RE: Prescription Question  Dr Naylor is out of the office this week but I sent the doxy in already, and placed a refill request for the Vyvanse to Dr Batista since he is on call, he will probably send it by the end of the day. Just check your mychart or with the pharmacy.  Thanks      ----- Message -----   From: Honey Lama   Sent: 8/7/2019 7:34 AM EDT   To: Antonoi Naylor MD  Subject: RE: Prescription Question    Hi... I need a refill on doxycycline (I take it daily) and Vyvanse. I will need it by Fri. AM because [facility] rx is closed on [address].     Thanks,    Honey  ----- Message -----  From: TRI CORRAL  Sent: 7/8/2019 9:14 AM EDT  To: Honey Lama  Subject: RE: Prescription Question  I will put in the request to Dr. Naylor  Thanks      ----- Message -----   From: Honey Lama   Sent: 7/7/2019 11:07 AM EDT   To: Antonio Naylor MD  Subject: RE: Prescription Question    Hi... can you send a Vyvanse refill to [facility] pharmacy?     Thanks,    Honey  ----- Message -----  From: Antonio Naylor MD  Sent: 6/6/2019 10:35 PM EDT  To: Honey Lama  Subject: RE: Prescription Question  sent    ----- Message -----   From: Honey Lama   Sent: 6/6/2019 8:02 AM EDT   To: Antonio Naylor MD  Subject: Prescription Question    Hi... I need a refill on Vyvanse to [facility].     ThanksHoney

## 2019-09-09 RX ORDER — MONTELUKAST SODIUM 10 MG/1
TABLET ORAL
Qty: 90 TABLET | Refills: 0 | Status: SHIPPED | OUTPATIENT
Start: 2019-09-09 | End: 2019-12-16 | Stop reason: SDUPTHER

## 2019-09-11 RX ORDER — NORETHINDRONE ACETATE AND ETHINYL ESTRADIOL 1; .02 MG/1; MG/1
TABLET ORAL
Qty: 21 TABLET | Refills: 11 | Status: SHIPPED | OUTPATIENT
Start: 2019-09-11 | End: 2020-08-11 | Stop reason: SDUPTHER

## 2019-09-13 ENCOUNTER — HOSPITAL ENCOUNTER (OUTPATIENT)
Dept: MAMMOGRAPHY | Facility: HOSPITAL | Age: 44
Discharge: HOME | End: 2019-09-13
Admitting: INTERNAL MEDICINE

## 2019-09-13 DIAGNOSIS — Z12.39 SCREENING BREAST EXAMINATION: ICD-10-CM

## 2019-09-13 PROCEDURE — 77063 BREAST TOMOSYNTHESIS BI: CPT

## 2019-09-13 PROCEDURE — 77067 SCR MAMMO BI INCL CAD: CPT

## 2019-09-27 ENCOUNTER — OFFICE VISIT (OUTPATIENT)
Dept: GASTROENTEROLOGY | Facility: CLINIC | Age: 44
End: 2019-09-27

## 2019-09-27 VITALS
DIASTOLIC BLOOD PRESSURE: 80 MMHG | TEMPERATURE: 97.7 F | SYSTOLIC BLOOD PRESSURE: 110 MMHG | HEART RATE: 67 BPM | BODY MASS INDEX: 26.23 KG/M2 | WEIGHT: 133.6 LBS | OXYGEN SATURATION: 95 % | HEIGHT: 60 IN

## 2019-09-27 DIAGNOSIS — R79.0 ABNORMAL BLOOD LEVEL OF COPPER: Primary | ICD-10-CM

## 2019-09-27 PROCEDURE — 99214 OFFICE O/P EST MOD 30 MIN: CPT | Performed by: INTERNAL MEDICINE

## 2019-09-27 NOTE — PROGRESS NOTES
PCP: Antonio Naylor MD    Chief Complaint   Patient presents with   • Follow-up     abnormal blood level of copper       History of Present Illness:   HPI  Mrs. Lama returns to the office for follow-up.  She was noted to have elevated serum copper levels.  The patient has no history of elevated liver panel test.  He has been diagnosed with fibromyalgia and is followed by rheumatology.  She is unaware of any family history of liver disease.  The patient denies any change in the color of her eyes or skin.  Mrs. Lama denies any abdominal pain with meals.  There is no history of nausea.  The patient denies any difficult or painful swallowing.  Her diet for breakfast usually consist of a peanut bar with almonds.  She states for lunch she will generally have a sandwich and chips.  The patient states for supper the meal could include fast food or red meat.  The patient does not eat a concern amount of fish.  Past Medical History:   Diagnosis Date   • Allergic rhinitis    • Anxiety and depression    • Arthritis    • Asthma    • Body piercing     BILATERAL EARS   • Bursitis of left hip     h/o   • Diarrhea    • Fibromyalgia    • GERD (gastroesophageal reflux disease)    • Hypercupremia    • Hypertension    • IBS (irritable bowel syndrome)    • Migraine    • Nausea with vomiting    • PCOS (polycystic ovarian syndrome)    • PONV (postoperative nausea and vomiting)    • Sore throat    • Strep pharyngitis    • Vitamin D deficiency        Past Surgical History:   Procedure Laterality Date   • ABDOMINAL SURGERY     • BREAST SURGERY      biopsy   •  SECTION     • CHOLECYSTECTOMY     • CYSTECTOMY      pilonidal cyst resection   • NISSEN FUNDOPLICATION N/A 10/30/2017    Procedure: NISSEN FUNDOPLICATION LAPAROSCOPIC;  Surgeon: Megha Torres MD;  Location: [facility];  Service:    • OTHER SURGICAL HISTORY       History of Neuroplasty With Transposition Of Ulnar Nerve - At Elbow   • UPPER GASTROINTESTINAL ENDOSCOPY     •  WISDOM TOOTH EXTRACTION           Current Outpatient Medications:   •  albuterol (PROAIR RESPICLICK) 108 (90 BASE) MCG/ACT inhaler, Inhale 1 puff Every 4 (Four) Hours As Needed for Wheezing or Shortness of Air., Disp: 1 inhaler, Rfl: 0  •  amitriptyline (ELAVIL) 10 MG tablet, Take 1 tablet by mouth Every Night., Disp: 30 tablet, Rfl: 2  •  amLODIPine (NORVASC) 5 MG tablet, Take 1 to 2 tablets by mouth once daily, Disp: 60 tablet, Rfl: 3  •  carvedilol (COREG) 12.5 MG tablet, Take 1 tablet by mouth 2 (Two) Times a Day. (Patient taking differently: Take 12.5 mg by mouth 2 (Two) Times a Day. 3.25mg bid), Disp: 60 tablet, Rfl: 3  •  Cetirizine HCl (ZYRTEC ALLERGY) 10 MG capsule, Take  by mouth Daily As Needed., Disp: , Rfl:   •  Cholecalciferol (VITAMIN D3) 2000 UNITS tablet, Take  by mouth Daily., Disp: , Rfl:   •  doxycycline (VIBRAMYCIN) 100 MG capsule, Take 1 capsule by mouth Daily., Disp: 90 capsule, Rfl: 3  •  DULoxetine (CYMBALTA) 60 MG capsule, Take 1 capsule by mouth 2 (Two) Times a Day. (Patient taking differently: Take 60 mg by mouth Daily.), Disp: 60 capsule, Rfl: 3  •  lisdexamfetamine (VYVANSE) 30 MG capsule, Take 1 capsule by mouth Every Morning, Disp: 30 capsule, Rfl: 0  •  montelukast (SINGULAIR) 10 MG tablet, Take 1 tablet by mouth once daily at bedtime, Disp: 90 tablet, Rfl: 0  •  naproxen (NAPROSYN) 500 MG tablet, Take 1 tablet by mouth 2 (Two) Times a Day with food, Disp: 180 tablet, Rfl: 5  •  norethindrone-ethinyl estradiol (MICROGESTIN 1/20) 1-20 MG-MCG per tablet, Take 1 tablet by mouth once daily, Disp: 21 tablet, Rfl: 11  •  OXcarbazepine (TRILEPTAL) 300 MG tablet, Take 1 tablet by mouth in the morning and 2 tablets at bedtime, Disp: 270 tablet, Rfl: 1  •  pregabalin (LYRICA) 100 MG capsule, Take 100 mg by mouth 2 (Two) Times a Day., Disp: , Rfl:   •  raNITIdine (ZANTAC) 150 MG tablet, Take 150 mg by mouth Daily., Disp: , Rfl:   •  spironolactone (ALDACTONE) 25 MG tablet, Take 2 tablets by  mouth Daily. (Patient taking differently: Take 25 mg by mouth Daily.), Disp: 180 tablet, Rfl: 3  •  tiZANidine (ZANAFLEX) 4 MG tablet, Take 1 tablet by mouth 3 (Three) Times a Day. (Patient taking differently: Take 4 mg by mouth Every 8 (Eight) Hours As Needed.), Disp: 90 tablet, Rfl: 1  •  Zinc 50 MG tablet, Take 1 tablet by mouth Daily., Disp: , Rfl:     Allergies   Allergen Reactions   • Minocycline    • Nifedipine Other (See Comments)     Chest discomfort, headache, nausea   • Rifampin    • Sulfamethoxazole-Trimethoprim        Family History   Problem Relation Age of Onset   • Arthritis Father    • Diabetes Father    • Hyperlipidemia Father    • Hypertension Father    • Kidney disease Father    • ABBE disease Father    • Thyroid cancer Father    • Cancer Maternal Aunt         Lung   • Cancer Maternal Uncle         Lung   • Stroke Paternal Aunt    • Alzheimer's disease Maternal Grandmother    • Stroke Maternal Grandmother    • Other Maternal Grandfather         pacemaker   • Arthritis Paternal Grandmother    • Hypertension Paternal Grandmother    • Heart attack Paternal Grandfather    • Alzheimer's disease Paternal Grandfather    • Arthritis Paternal Grandfather    • Hypertension Paternal Grandfather    • Cancer Other    • Colon cancer Cousin        Social History     Socioeconomic History   • Marital status:      Spouse name: Not on file   • Number of children: Not on file   • Years of education: Not on file   • Highest education level: Not on file   Tobacco Use   • Smoking status: Never Smoker   • Smokeless tobacco: Never Used   Substance and Sexual Activity   • Alcohol use: Yes     Comment: ocassionally   • Drug use: No     Comment: naproxen   • Sexual activity: Defer       Review of Systems   Constitutional: Positive for fatigue. Negative for activity change, appetite change, fever and unexpected weight change.   HENT: Negative for dental problem, hearing loss, mouth sores, postnasal drip, sneezing,  trouble swallowing and voice change.    Eyes: Negative for pain, redness, itching and visual disturbance.   Respiratory: Negative for cough, choking, chest tightness, shortness of breath and wheezing.    Cardiovascular: Negative for chest pain, palpitations and leg swelling.   Gastrointestinal: Positive for constipation. Negative for abdominal distention, abdominal pain, anal bleeding, blood in stool, diarrhea, nausea, rectal pain and vomiting.        Heartburn- mild   Endocrine: Negative for cold intolerance, heat intolerance, polydipsia, polyphagia and polyuria.   Genitourinary: Negative.  Negative for dysuria, enuresis, flank pain, hematuria and urgency.   Musculoskeletal: Negative for arthralgias, back pain, gait problem, joint swelling and myalgias.   Skin: Negative for color change, pallor and rash.   Allergic/Immunologic: Negative for environmental allergies, food allergies and immunocompromised state.   Neurological: Negative for dizziness, tremors, seizures, facial asymmetry, speech difficulty, numbness and headaches.   Hematological: Negative for adenopathy.   Psychiatric/Behavioral: Negative for behavioral problems, confusion, dysphoric mood, hallucinations and self-injury.       Vitals:    09/27/19 0905   BP: 110/80   Pulse: 67   Temp: 97.7 °F (36.5 °C)   SpO2: 95%       Physical Exam   Constitutional: She is oriented to person, place, and time. She appears well-nourished. No distress.   HENT:   Head: Atraumatic.   Mouth/Throat: Oropharynx is clear and moist. No oropharyngeal exudate.   Eyes: EOM are normal. No scleral icterus.   Neck: Neck supple. No thyromegaly present.   Cardiovascular: Normal rate, regular rhythm and normal heart sounds. Exam reveals no gallop.   No murmur heard.  Pulmonary/Chest: Effort normal and breath sounds normal. She has no wheezes. She has no rales.   Abdominal: Soft. Bowel sounds are normal. There is no rebound and no guarding.   No organomegaly   Musculoskeletal: Normal  range of motion. She exhibits no edema.   Lymphadenopathy:     She has no cervical adenopathy.   Neurological: She is alert and oriented to person, place, and time. She exhibits normal muscle tone.   Skin: Skin is dry. No erythema.   Psychiatric: She has a normal mood and affect. Her behavior is normal. Thought content normal.   Vitals reviewed.      Honey was seen today for follow-up.    Diagnoses and all orders for this visit:    Abnormal blood level of copper  -     Copper, Urine - Urine, Clean Catch; Future    The patient has a history of elevated serum copper level.  There are no abnormalities of the transaminases or alkaline phosphatase.  The patient also has a normal ceruloplasmin.  The findings on the laboratory data at this time are not consistent with Judson's disease.  Discussed her dietary intake and she does have an increase intake of almonds.       Plan: Will recheck a 24-hour urine for copper.  This was normal about 5 years ago.           I discussed CT-guided liver biopsy but at this juncture would not proceed with a biopsy unless there was an abnormality in the 24-hour copper.

## 2019-10-17 ENCOUNTER — TELEPHONE (OUTPATIENT)
Dept: GASTROENTEROLOGY | Facility: CLINIC | Age: 44
End: 2019-10-17

## 2019-11-04 NOTE — TELEPHONE ENCOUNTER
Regarding: RE: Prescription Question  Contact: [phone]  ----- Message from Mychart, Generic sent at 11/4/2019  6:37 AM EST -----    I need a refill on Vyvanse sent to [facility]. Btw, I have not started the CBD oil.     Thanks!   ----- Message -----  From: Antonio Naylor MD  Sent: 10/4/2019  2:19 PM EDT  To: Honey Lama  Subject: RE: Prescription Question  sent    ----- Message -----     From: Honey Lama     Sent: 10/4/2019  6:43 AM EDT       To: Antonio Naylor MD  Subject: Prescription Question    Hi.. can I get a Vyvanse refill sent to [facility]?    Honey Tyler  ----- Message -----  From: TRI CORRAL  Sent: 8/7/2019  8:45 AM EDT  To: Honey Lama  Subject: RE: Prescription Question  Dr Naylor is out of the office this week but I sent the doxy in already, and placed a refill request for the Vyvanse to Dr Batista since he is on call, he will probably send it by the end of the day. Just check your mychart or with the pharmacy.  Thanks      ----- Message -----     From: Honey Lama     Sent: 8/7/2019  7:34 AM EDT       To: Antonio Naylor MD  Subject: RE: Prescription Question    Hi... I need a refill on doxycycline (I take it daily) and Vyvanse. I will need it by Fri. AM because [facility] rx is closed on [address].     ThanksHoney  ----- Message -----  From: TRI CORRAL  Sent: 7/8/2019  9:14 AM EDT  To: Honey Lama  Subject: RE: Prescription Question  I will put in the request to Dr. Naylor  Thanks      ----- Message -----     From: Honey Lama     Sent: 7/7/2019 11:07 AM EDT       To: Antonio Naylor MD  Subject: RE: Prescription Question    Hi... can you send a Vyvanse refill to [facility] pharmacy?     Thanks,    Honey  ----- Message -----  From: Antonio Naylor MD  Sent: 6/6/2019 10:35 PM EDT  To: Honey Lama  Subject: RE: Prescription Question  sent    ----- Message -----     From: Honey Lama     Sent: 6/6/2019  8:02 AM EDT        To: Antonio Naylor MD  Subject: Prescription Question    Hi... I need a refill on Vyvanse to [facility].     ThanksHoney

## 2019-11-27 ENCOUNTER — TELEPHONE (OUTPATIENT)
Dept: NEUROLOGY | Facility: CLINIC | Age: 44
End: 2019-11-27

## 2019-11-27 NOTE — TELEPHONE ENCOUNTER
PT HAS SEEN YOU TWICE AND ON BOTH NOTES SHE NOTICED THAT IT SAID SHE HAS HAD A GASTRIC BYPASS AND THAT SHE HAS RA. PT STATES THAT THAT IS NOT THE CASE AND WOULD LIKE FOR HER NOTES TO BE CORRECTED.

## 2019-12-16 RX ORDER — MONTELUKAST SODIUM 10 MG/1
10 TABLET ORAL
Qty: 90 TABLET | Refills: 3 | Status: SHIPPED | OUTPATIENT
Start: 2019-12-16 | End: 2020-11-24

## 2020-01-08 ENCOUNTER — TRANSCRIBE ORDERS (OUTPATIENT)
Dept: LAB | Facility: HOSPITAL | Age: 45
End: 2020-01-08

## 2020-01-08 ENCOUNTER — LAB (OUTPATIENT)
Dept: LAB | Facility: HOSPITAL | Age: 45
End: 2020-01-08

## 2020-01-08 ENCOUNTER — OFFICE VISIT (OUTPATIENT)
Dept: GASTROENTEROLOGY | Facility: CLINIC | Age: 45
End: 2020-01-08

## 2020-01-08 VITALS
BODY MASS INDEX: 26.78 KG/M2 | HEIGHT: 60 IN | TEMPERATURE: 98.4 F | DIASTOLIC BLOOD PRESSURE: 84 MMHG | HEART RATE: 105 BPM | WEIGHT: 136.4 LBS | OXYGEN SATURATION: 97 % | SYSTOLIC BLOOD PRESSURE: 112 MMHG

## 2020-01-08 DIAGNOSIS — E28.2 POLYCYSTIC OVARIES: ICD-10-CM

## 2020-01-08 DIAGNOSIS — E53.8 BIOTIN-(PROPIONYL-COA-CARBOXYLASE) LIGASE DEFICIENCY: ICD-10-CM

## 2020-01-08 DIAGNOSIS — K21.00 GASTROESOPHAGEAL REFLUX DISEASE WITH ESOPHAGITIS: Primary | ICD-10-CM

## 2020-01-08 DIAGNOSIS — R12 HEARTBURN: ICD-10-CM

## 2020-01-08 DIAGNOSIS — E53.8 VITAMIN B12 DEFICIENCY (NON ANEMIC): ICD-10-CM

## 2020-01-08 DIAGNOSIS — Z00.00 ROUTINE GENERAL MEDICAL EXAMINATION AT A HEALTH CARE FACILITY: ICD-10-CM

## 2020-01-08 DIAGNOSIS — Z00.00 ROUTINE GENERAL MEDICAL EXAMINATION AT A HEALTH CARE FACILITY: Primary | ICD-10-CM

## 2020-01-08 LAB
ALBUMIN SERPL-MCNC: 4.6 G/DL (ref 3.5–5.2)
ALBUMIN/GLOB SERPL: 1.4 G/DL
ALP SERPL-CCNC: 92 U/L (ref 39–117)
ALT SERPL W P-5'-P-CCNC: 14 U/L (ref 1–33)
ANION GAP SERPL CALCULATED.3IONS-SCNC: 13.5 MMOL/L (ref 5–15)
AST SERPL-CCNC: 16 U/L (ref 1–32)
BILIRUB SERPL-MCNC: 0.4 MG/DL (ref 0.2–1.2)
BUN BLD-MCNC: 20 MG/DL (ref 6–20)
BUN/CREAT SERPL: 30.3 (ref 7–25)
CALCIUM SPEC-SCNC: 9.3 MG/DL (ref 8.6–10.5)
CHLORIDE SERPL-SCNC: 99 MMOL/L (ref 98–107)
CO2 SERPL-SCNC: 25.5 MMOL/L (ref 22–29)
CREAT BLD-MCNC: 0.66 MG/DL (ref 0.57–1)
GFR SERPL CREATININE-BSD FRML MDRD: 97 ML/MIN/1.73
GLOBULIN UR ELPH-MCNC: 3.2 GM/DL
GLUCOSE BLD-MCNC: 88 MG/DL (ref 65–99)
POTASSIUM BLD-SCNC: 3.8 MMOL/L (ref 3.5–5.2)
PROT SERPL-MCNC: 7.8 G/DL (ref 6–8.5)
SODIUM BLD-SCNC: 138 MMOL/L (ref 136–145)
TESTOST SERPL-MCNC: 37 NG/DL (ref 8.4–48.1)
VIT B12 BLD-MCNC: 1304 PG/ML (ref 211–946)

## 2020-01-08 PROCEDURE — 84403 ASSAY OF TOTAL TESTOSTERONE: CPT

## 2020-01-08 PROCEDURE — 99214 OFFICE O/P EST MOD 30 MIN: CPT | Performed by: INTERNAL MEDICINE

## 2020-01-08 PROCEDURE — 80053 COMPREHEN METABOLIC PANEL: CPT

## 2020-01-08 PROCEDURE — 36415 COLL VENOUS BLD VENIPUNCTURE: CPT

## 2020-01-08 PROCEDURE — 83525 ASSAY OF INSULIN: CPT

## 2020-01-08 PROCEDURE — 82607 VITAMIN B-12: CPT

## 2020-01-08 PROCEDURE — 83516 IMMUNOASSAY NONANTIBODY: CPT

## 2020-01-08 RX ORDER — OMEPRAZOLE 20 MG/1
20 CAPSULE, DELAYED RELEASE ORAL 2 TIMES DAILY
COMMUNITY
End: 2020-01-20 | Stop reason: ALTCHOICE

## 2020-01-08 NOTE — PROGRESS NOTES
PCP: Antonio Naylor MD    Chief Complaint   Patient presents with   • Follow-up     Patient with some heartburn and cough.  History of Present Illness:   HPI  Mrs. Lama returns for follow up. She has experienced some heartburn a couple of days a week. The patient denies difficult or painful swallowing. Mrs. Lama takes Prilosec daily. There are no issues with abdominal pain. She also has a cough that can be intermittent. Mrs. Lama denies productive cough. She has regular bowel habits without blood in the stool. The patient has not obtained the 24 hour urine for copper as of yet.  Past Medical History:   Diagnosis Date   • Allergic rhinitis    • Anxiety and depression    • Arthritis    • Asthma    • Body piercing     BILATERAL EARS   • Bursitis of left hip     h/o   • Diarrhea    • Fibromyalgia    • GERD (gastroesophageal reflux disease)    • Hypercupremia    • Hypertension    • IBS (irritable bowel syndrome)    • Migraine    • Nausea with vomiting    • PCOS (polycystic ovarian syndrome)    • PONV (postoperative nausea and vomiting)    • Sore throat    • Strep pharyngitis    • Vitamin D deficiency        Past Surgical History:   Procedure Laterality Date   • ABDOMINAL SURGERY     • BREAST SURGERY      biopsy   •  SECTION     • CHOLECYSTECTOMY     • CYSTECTOMY      pilonidal cyst resection   • NISSEN FUNDOPLICATION N/A 10/30/2017    Procedure: NISSEN FUNDOPLICATION LAPAROSCOPIC;  Surgeon: Megha Torres MD;  Location: [facility];  Service:    • OTHER SURGICAL HISTORY       History of Neuroplasty With Transposition Of Ulnar Nerve - At Elbow   • UPPER GASTROINTESTINAL ENDOSCOPY     • WISDOM TOOTH EXTRACTION           Current Outpatient Medications:   •  albuterol (PROAIR RESPICLICK) 108 (90 BASE) MCG/ACT inhaler, Inhale 1 puff Every 4 (Four) Hours As Needed for Wheezing or Shortness of Air., Disp: 1 inhaler, Rfl: 0  •  amLODIPine (NORVASC) 5 MG tablet, Take 1 to 2 tablets by mouth once daily, Disp: 60  tablet, Rfl: 3  •  carvedilol (COREG) 12.5 MG tablet, Take 1 tablet by mouth 2 (Two) Times a Day. (Patient taking differently: Take 12.5 mg by mouth 2 (Two) Times a Day. 3.25mg bid), Disp: 60 tablet, Rfl: 3  •  Cetirizine HCl (ZYRTEC ALLERGY) 10 MG capsule, Take  by mouth Daily As Needed., Disp: , Rfl:   •  Cholecalciferol (VITAMIN D3) 2000 UNITS tablet, Take  by mouth Daily., Disp: , Rfl:   •  doxycycline (VIBRAMYCIN) 100 MG capsule, Take 1 capsule by mouth Daily., Disp: 90 capsule, Rfl: 3  •  DULoxetine (CYMBALTA) 60 MG capsule, Take 1 capsule by mouth 2 (Two) Times a Day. (Patient taking differently: Take 60 mg by mouth Daily.), Disp: 60 capsule, Rfl: 3  •  lisdexamfetamine (VYVANSE) 40 MG capsule, Take 1 capsule by mouth Daily, Disp: 30 capsule, Rfl: 0  •  meloxicam (MOBIC) 15 MG tablet, Take 1 tablet by mouth Daily., Disp: 30 tablet, Rfl: 5  •  montelukast (SINGULAIR) 10 MG tablet, Take 1 tablet by mouth every night at bedtime., Disp: 90 tablet, Rfl: 3  •  norethindrone-ethinyl estradiol (MICROGESTIN 1/20) 1-20 MG-MCG per tablet, Take 1 tablet by mouth once daily, Disp: 21 tablet, Rfl: 11  •  omeprazole (priLOSEC) 20 MG capsule, Take 20 mg by mouth 2 (Two) Times a Day., Disp: , Rfl:   •  OXcarbazepine (TRILEPTAL) 300 MG tablet, Take 1 tablet by mouth in the morning and 2 tablets in the evening, Disp: 270 tablet, Rfl: 1  •  pregabalin (LYRICA) 100 MG capsule, Take 1 capsule by mouth 2 (Two) Times a Day., Disp: 180 capsule, Rfl: 1  •  spironolactone (ALDACTONE) 25 MG tablet, Take 2 tablets by mouth Daily. (Patient taking differently: Take 25 mg by mouth Daily.), Disp: 180 tablet, Rfl: 3  •  Zinc 50 MG tablet, Take 1 tablet by mouth Daily., Disp: , Rfl:     Allergies   Allergen Reactions   • Minocycline    • Nifedipine Other (See Comments)     Chest discomfort, headache, nausea   • Rifampin    • Sulfamethoxazole-Trimethoprim        Family History   Problem Relation Age of Onset   • Arthritis Father    • Diabetes  Father    • Hyperlipidemia Father    • Hypertension Father    • Kidney disease Father    • ABBE disease Father    • Thyroid cancer Father    • Cancer Maternal Aunt         Lung   • Cancer Maternal Uncle         Lung   • Stroke Paternal Aunt    • Alzheimer's disease Maternal Grandmother    • Stroke Maternal Grandmother    • Other Maternal Grandfather         pacemaker   • Arthritis Paternal Grandmother    • Hypertension Paternal Grandmother    • Heart attack Paternal Grandfather    • Alzheimer's disease Paternal Grandfather    • Arthritis Paternal Grandfather    • Hypertension Paternal Grandfather    • Cancer Other    • Colon cancer Cousin        Social History     Socioeconomic History   • Marital status:      Spouse name: Not on file   • Number of children: Not on file   • Years of education: Not on file   • Highest education level: Not on file   Tobacco Use   • Smoking status: Never Smoker   • Smokeless tobacco: Never Used   Substance and Sexual Activity   • Alcohol use: Yes     Comment: ocassionally   • Drug use: No     Comment: naproxen   • Sexual activity: Defer       Review of Systems   Constitutional: Negative for activity change, appetite change, fatigue, fever and unexpected weight change.   HENT: Positive for trouble swallowing. Negative for dental problem, hearing loss, mouth sores, postnasal drip, sneezing and voice change.    Eyes: Negative for pain, redness, itching and visual disturbance.   Respiratory: Positive for cough. Negative for choking, chest tightness, shortness of breath and wheezing.    Cardiovascular: Negative for chest pain, palpitations and leg swelling.   Gastrointestinal: Negative for abdominal distention, abdominal pain, anal bleeding, blood in stool, constipation, diarrhea, nausea, rectal pain and vomiting.        Heartburn   Endocrine: Negative for cold intolerance, heat intolerance, polydipsia, polyphagia and polyuria.   Genitourinary: Negative.  Negative for dysuria,  enuresis, flank pain, hematuria and urgency.   Musculoskeletal: Negative for arthralgias, back pain, gait problem, joint swelling and myalgias.   Skin: Negative for color change, pallor and rash.   Allergic/Immunologic: Negative for environmental allergies, food allergies and immunocompromised state.   Neurological: Negative for dizziness, tremors, seizures, facial asymmetry, speech difficulty, numbness and headaches.   Hematological: Negative for adenopathy.   Psychiatric/Behavioral: Negative for behavioral problems, confusion, dysphoric mood, hallucinations and self-injury.       Vitals:    01/08/20 1529   BP: 112/84   Pulse: 105   Temp: 98.4 °F (36.9 °C)   SpO2: 97%       Physical Exam   Constitutional: She is oriented to person, place, and time. She appears well-nourished. No distress.   HENT:   Head: Atraumatic.   Mouth/Throat: Oropharynx is clear and moist. No oropharyngeal exudate.   Eyes: EOM are normal. No scleral icterus.   Neck: Neck supple. No thyromegaly present.   Cardiovascular: Normal rate, regular rhythm and normal heart sounds. Exam reveals no gallop.   No murmur heard.  Pulmonary/Chest: Effort normal and breath sounds normal. She has no wheezes. She has no rales.   Abdominal: Soft. Bowel sounds are normal. There is no tenderness. There is no guarding.   Musculoskeletal: Normal range of motion. She exhibits no edema or tenderness.   Lymphadenopathy:     She has no cervical adenopathy.   Neurological: She is alert and oriented to person, place, and time. She exhibits normal muscle tone.   Skin: Skin is dry. No erythema.   Psychiatric: She has a normal mood and affect. Her behavior is normal. Thought content normal.   Vitals reviewed.      Honey was seen today for follow-up.    Diagnoses and all orders for this visit:    Gastroesophageal reflux disease with esophagitis  -     Esophagogastroduodenoscopy; Future    Heartburn  -     Esophagogastroduodenoscopy; Future    Abnormal copper level    The  patient has issues with heartburn without progressive dysphagia. She had a Nissen fundoplication in 2017. She has taken a PPI regularly. The last endoscopy was prior to Nissen surgery.      Plan: Will proceed with an EGD.            Will give a trial of Dexilant 60 mg 1 po daily.           Will follow up in 6 months.

## 2020-01-09 LAB
TTG IGA SER-ACNC: <2 U/ML (ref 0–3)
TTG IGG SER-ACNC: <2 U/ML (ref 0–5)

## 2020-01-12 LAB — INSULIN SERPL-ACNC: 6.6 UIU/ML

## 2020-01-17 ENCOUNTER — LAB REQUISITION (OUTPATIENT)
Dept: LAB | Facility: HOSPITAL | Age: 45
End: 2020-01-17

## 2020-01-17 ENCOUNTER — OUTSIDE FACILITY SERVICE (OUTPATIENT)
Dept: GASTROENTEROLOGY | Facility: CLINIC | Age: 45
End: 2020-01-17

## 2020-01-17 DIAGNOSIS — R12 HEARTBURN: ICD-10-CM

## 2020-01-17 DIAGNOSIS — K21.00 GASTRO-ESOPHAGEAL REFLUX DISEASE WITH ESOPHAGITIS: ICD-10-CM

## 2020-01-17 PROCEDURE — 43239 EGD BIOPSY SINGLE/MULTIPLE: CPT | Performed by: INTERNAL MEDICINE

## 2020-01-17 PROCEDURE — 88305 TISSUE EXAM BY PATHOLOGIST: CPT | Performed by: INTERNAL MEDICINE

## 2020-01-18 ENCOUNTER — PATIENT MESSAGE (OUTPATIENT)
Dept: GASTROENTEROLOGY | Facility: CLINIC | Age: 45
End: 2020-01-18

## 2020-01-20 DIAGNOSIS — K21.9 GASTROESOPHAGEAL REFLUX DISEASE, ESOPHAGITIS PRESENCE NOT SPECIFIED: Primary | ICD-10-CM

## 2020-01-20 LAB
CYTO UR: NORMAL
LAB AP CASE REPORT: NORMAL
LAB AP CLINICAL INFORMATION: NORMAL
PATH REPORT.FINAL DX SPEC: NORMAL
PATH REPORT.GROSS SPEC: NORMAL

## 2020-01-20 RX ORDER — DEXLANSOPRAZOLE 60 MG/1
60 CAPSULE, DELAYED RELEASE ORAL DAILY
Qty: 30 CAPSULE | Refills: 3 | Status: SHIPPED | OUTPATIENT
Start: 2020-01-20 | End: 2020-04-27

## 2020-01-20 NOTE — TELEPHONE ENCOUNTER
From: Honey Lama  To: Rodrick Singh MD  Sent: 1/18/2020 10:39 AM EST  Subject: Prescription Question    Can you send in a rx for Dexilant if that is what you want me to continue to take? [facility]

## 2020-01-21 ENCOUNTER — TELEPHONE (OUTPATIENT)
Dept: GASTROENTEROLOGY | Facility: CLINIC | Age: 45
End: 2020-01-21

## 2020-01-21 NOTE — TELEPHONE ENCOUNTER
I called and left a message regarding the pathology.  The patient should continue the proton pump inhibitor.

## 2020-02-01 ENCOUNTER — PATIENT MESSAGE (OUTPATIENT)
Dept: GASTROENTEROLOGY | Facility: CLINIC | Age: 45
End: 2020-02-01

## 2020-02-03 ENCOUNTER — TELEPHONE (OUTPATIENT)
Dept: GASTROENTEROLOGY | Facility: CLINIC | Age: 45
End: 2020-02-03

## 2020-02-03 ENCOUNTER — PATIENT MESSAGE (OUTPATIENT)
Dept: GASTROENTEROLOGY | Facility: CLINIC | Age: 45
End: 2020-02-03

## 2020-02-03 NOTE — TELEPHONE ENCOUNTER
From: Honey Lama  To: Rodrick Singh MD  Sent: 2/1/2020 1:53 PM EST  Subject: Prescription Question    Hi! Can you see if my last question can be answered? About sending the letter and other info. to the right office for Dr. Koch?  Thanks!  ----- Message -----  From: TRI MANZANO  Sent: 1/20/2020 7:54 AM EST  To: Honey Lama  Subject: RE: Prescription Question  Dexilant has been sent to [facility]. Thanks CB Whitfield.    ----- Message -----   From: Honey Lama   Sent: 1/18/2020 10:39 AM EST   To: Rodrick Singh MD  Subject: Prescription Question    Can you send in a rx for Dexilant if that is what you want me to continue to take? [facility]

## 2020-02-04 NOTE — TELEPHONE ENCOUNTER
From: Honey Lama  To: Rodrick Singh MD  Sent: 2/3/2020 11:57 AM EST  Subject: Prescription Question    You took care of it! I am sorry to bother you, but 1 more thing. When do I need to come back for a visit and when do I need another scope?     TyNan Méndez  ----- Message -----  From: TRI MANZANO  Sent: 2/3/2020 8:00 AM EST  To: Honey Lama  Subject: RE: Prescription Question  Good morning Honey,  I'm trying to see which letter you are referring too. Is it your result letter from your procedure? Please clarify. I don't want to see the wrong information. Thanks CB Whitfield.    ----- Message -----   From: Honey Lama   Sent: 2/1/2020 1:53 PM EST   To: Rodrick Singh MD  Subject: Prescription Question    Hi! Can you see if my last question can be answered? About sending the letter and other info. to the right office for Dr. Koch?  Thanks!  ----- Message -----  From: TRI MANZANO  Sent: 1/20/2020 7:54 AM EST  To: Honey Lama  Subject: RE: Prescription Question  Dexilant has been sent to [facility]. Thanks CB Whitfield.    ----- Message -----   From: Honey Lama   Sent: 1/18/2020 10:39 AM EST   To: Rodrick Singh MD  Subject: Prescription Question    Can you send in a rx for Dexilant if that is what you want me to continue to take? [facility]

## 2020-04-27 DIAGNOSIS — K21.9 GASTROESOPHAGEAL REFLUX DISEASE, ESOPHAGITIS PRESENCE NOT SPECIFIED: ICD-10-CM

## 2020-04-27 RX ORDER — DEXLANSOPRAZOLE 60 MG/1
60 CAPSULE, DELAYED RELEASE ORAL DAILY
Qty: 30 CAPSULE | Refills: 3 | Status: SHIPPED | OUTPATIENT
Start: 2020-04-27

## 2020-05-15 ENCOUNTER — TRANSCRIBE ORDERS (OUTPATIENT)
Dept: LAB | Facility: HOSPITAL | Age: 45
End: 2020-05-15

## 2020-05-15 ENCOUNTER — LAB (OUTPATIENT)
Dept: LAB | Facility: HOSPITAL | Age: 45
End: 2020-05-15

## 2020-05-15 DIAGNOSIS — Z00.00 ROUTINE GENERAL MEDICAL EXAMINATION AT A HEALTH CARE FACILITY: Primary | ICD-10-CM

## 2020-05-15 DIAGNOSIS — Z00.00 ROUTINE GENERAL MEDICAL EXAMINATION AT A HEALTH CARE FACILITY: ICD-10-CM

## 2020-05-15 DIAGNOSIS — E83.00 DISORDER OF COPPER METABOLISM: Primary | ICD-10-CM

## 2020-05-15 PROCEDURE — 82570 ASSAY OF URINE CREATININE: CPT

## 2020-05-15 PROCEDURE — 86480 TB TEST CELL IMMUN MEASURE: CPT

## 2020-05-15 PROCEDURE — 81050 URINALYSIS VOLUME MEASURE: CPT

## 2020-05-15 PROCEDURE — 36415 COLL VENOUS BLD VENIPUNCTURE: CPT

## 2020-05-15 PROCEDURE — 82525 ASSAY OF COPPER: CPT

## 2020-05-19 LAB
COPPER 24H UR-MRATE: 19 UG/24 HR (ref 3–35)
COPPER UR-MCNC: 19 UG/L
COPPER/CREAT UR: 22 UG/G CREAT (ref 0–49)
CREAT 24H UR-MCNC: 0.85 G/L (ref 0.3–3)
QUANTIFERON CRITERIA: NORMAL
QUANTIFERON MITOGEN VALUE: >10 IU/ML
QUANTIFERON NIL VALUE: 0.02 IU/ML
QUANTIFERON TB1 AG VALUE: 0.03 IU/ML
QUANTIFERON TB2 AG VALUE: 0.02 IU/ML
QUANTIFERON-TB GOLD PLUS: NEGATIVE

## 2020-07-20 RX ORDER — NORETHINDRONE ACETATE AND ETHINYL ESTRADIOL 1; 20 MG/1; UG/1
TABLET ORAL
Qty: 21 TABLET | Refills: 11 | OUTPATIENT
Start: 2020-07-20

## 2020-07-27 ENCOUNTER — TRANSCRIBE ORDERS (OUTPATIENT)
Dept: ADMINISTRATIVE | Facility: HOSPITAL | Age: 45
End: 2020-07-27

## 2020-09-26 ENCOUNTER — TRANSCRIBE ORDERS (OUTPATIENT)
Dept: LAB | Facility: HOSPITAL | Age: 45
End: 2020-09-26

## 2020-09-26 ENCOUNTER — LAB (OUTPATIENT)
Dept: LAB | Facility: HOSPITAL | Age: 45
End: 2020-09-26

## 2020-09-26 DIAGNOSIS — R79.82 ELEVATED C-REACTIVE PROTEIN (CRP): ICD-10-CM

## 2020-09-26 DIAGNOSIS — Z00.00 ROUTINE GENERAL MEDICAL EXAMINATION AT A HEALTH CARE FACILITY: Primary | ICD-10-CM

## 2020-09-26 DIAGNOSIS — E55.9 VITAMIN D DEFICIENCY: ICD-10-CM

## 2020-09-26 DIAGNOSIS — R53.83 TIREDNESS: ICD-10-CM

## 2020-09-26 DIAGNOSIS — Z00.00 ROUTINE GENERAL MEDICAL EXAMINATION AT A HEALTH CARE FACILITY: ICD-10-CM

## 2020-09-26 LAB
25(OH)D3 SERPL-MCNC: 47.5 NG/ML (ref 30–100)
ALBUMIN SERPL-MCNC: 4.3 G/DL (ref 3.5–5.2)
ALBUMIN/GLOB SERPL: 1.5 G/DL
ALP SERPL-CCNC: 70 U/L (ref 39–117)
ALT SERPL W P-5'-P-CCNC: 18 U/L (ref 1–33)
ANION GAP SERPL CALCULATED.3IONS-SCNC: 8.9 MMOL/L (ref 5–15)
AST SERPL-CCNC: 15 U/L (ref 1–32)
BILIRUB SERPL-MCNC: 0.2 MG/DL (ref 0–1.2)
BUN SERPL-MCNC: 18 MG/DL (ref 6–20)
BUN/CREAT SERPL: 31 (ref 7–25)
CALCIUM SPEC-SCNC: 8.7 MG/DL (ref 8.6–10.5)
CHLORIDE SERPL-SCNC: 102 MMOL/L (ref 98–107)
CO2 SERPL-SCNC: 28.1 MMOL/L (ref 22–29)
CREAT SERPL-MCNC: 0.58 MG/DL (ref 0.57–1)
CRP SERPL-MCNC: 0.77 MG/DL (ref 0–0.5)
DEPRECATED RDW RBC AUTO: 40.5 FL (ref 37–54)
ERYTHROCYTE [DISTWIDTH] IN BLOOD BY AUTOMATED COUNT: 12.4 % (ref 12.3–15.4)
ERYTHROCYTE [SEDIMENTATION RATE] IN BLOOD: 6 MM/HR (ref 0–20)
GFR SERPL CREATININE-BSD FRML MDRD: 112 ML/MIN/1.73
GLOBULIN UR ELPH-MCNC: 2.8 GM/DL
GLUCOSE SERPL-MCNC: 75 MG/DL (ref 65–99)
HCT VFR BLD AUTO: 38.5 % (ref 34–46.6)
HGB BLD-MCNC: 13.1 G/DL (ref 12–15.9)
MCH RBC QN AUTO: 30.2 PG (ref 26.6–33)
MCHC RBC AUTO-ENTMCNC: 34 G/DL (ref 31.5–35.7)
MCV RBC AUTO: 88.7 FL (ref 79–97)
PLATELET # BLD AUTO: 309 10*3/MM3 (ref 140–450)
PMV BLD AUTO: 9.9 FL (ref 6–12)
POTASSIUM SERPL-SCNC: 3.7 MMOL/L (ref 3.5–5.2)
PROT SERPL-MCNC: 7.1 G/DL (ref 6–8.5)
RBC # BLD AUTO: 4.34 10*6/MM3 (ref 3.77–5.28)
SODIUM SERPL-SCNC: 139 MMOL/L (ref 136–145)
T4 FREE SERPL-MCNC: 0.77 NG/DL (ref 0.93–1.7)
TSH SERPL DL<=0.05 MIU/L-ACNC: 0.7 UIU/ML (ref 0.27–4.2)
VIT B12 BLD-MCNC: 383 PG/ML (ref 211–946)
WBC # BLD AUTO: 8.15 10*3/MM3 (ref 3.4–10.8)

## 2020-09-26 PROCEDURE — 36415 COLL VENOUS BLD VENIPUNCTURE: CPT

## 2020-09-26 PROCEDURE — 85652 RBC SED RATE AUTOMATED: CPT

## 2020-09-26 PROCEDURE — 86140 C-REACTIVE PROTEIN: CPT

## 2020-09-26 PROCEDURE — 84439 ASSAY OF FREE THYROXINE: CPT

## 2020-09-26 PROCEDURE — 85027 COMPLETE CBC AUTOMATED: CPT

## 2020-09-26 PROCEDURE — 82607 VITAMIN B-12: CPT

## 2020-09-26 PROCEDURE — 84443 ASSAY THYROID STIM HORMONE: CPT

## 2020-09-26 PROCEDURE — 82306 VITAMIN D 25 HYDROXY: CPT

## 2020-09-26 PROCEDURE — 80053 COMPREHEN METABOLIC PANEL: CPT

## 2020-11-24 RX ORDER — MONTELUKAST SODIUM 10 MG/1
10 TABLET ORAL
Qty: 90 TABLET | Refills: 3 | Status: SHIPPED | OUTPATIENT
Start: 2020-11-24

## 2020-12-16 ENCOUNTER — TRANSCRIBE ORDERS (OUTPATIENT)
Dept: LAB | Facility: HOSPITAL | Age: 45
End: 2020-12-16

## 2020-12-16 ENCOUNTER — LAB (OUTPATIENT)
Dept: LAB | Facility: HOSPITAL | Age: 45
End: 2020-12-16

## 2020-12-16 DIAGNOSIS — R94.6 NONSPECIFIC ABNORMAL RESULTS OF THYROID FUNCTION STUDY: Primary | ICD-10-CM

## 2020-12-16 DIAGNOSIS — R94.6 NONSPECIFIC ABNORMAL RESULTS OF THYROID FUNCTION STUDY: ICD-10-CM

## 2020-12-16 LAB
T3FREE SERPL-MCNC: 3.53 PG/ML (ref 2–4.4)
T4 SERPL-MCNC: 5.25 MCG/DL (ref 4.5–11.7)
TSH SERPL DL<=0.05 MIU/L-ACNC: 1.45 UIU/ML (ref 0.27–4.2)

## 2020-12-16 PROCEDURE — 84481 FREE ASSAY (FT-3): CPT

## 2020-12-16 PROCEDURE — 84436 ASSAY OF TOTAL THYROXINE: CPT

## 2020-12-16 PROCEDURE — 84443 ASSAY THYROID STIM HORMONE: CPT

## 2020-12-16 PROCEDURE — 36415 COLL VENOUS BLD VENIPUNCTURE: CPT

## 2021-03-03 ENCOUNTER — OFFICE VISIT (OUTPATIENT)
Dept: GASTROENTEROLOGY | Facility: CLINIC | Age: 46
End: 2021-03-03

## 2021-03-03 DIAGNOSIS — R14.0 BLOATING: ICD-10-CM

## 2021-03-03 DIAGNOSIS — K59.00 CONSTIPATION, UNSPECIFIED CONSTIPATION TYPE: ICD-10-CM

## 2021-03-03 DIAGNOSIS — R19.4 CHANGE IN BOWEL HABITS: Primary | ICD-10-CM

## 2021-03-03 PROCEDURE — 99214 OFFICE O/P EST MOD 30 MIN: CPT | Performed by: INTERNAL MEDICINE

## 2021-03-03 NOTE — PROGRESS NOTES
You have chosen to receive care through a telephone visit. Do you consent to use a telephone visit for your medical care today? Yes  Patient Name: Honey Lama  YOB: 1975   Medical Record number: [id number]     PCP: Annamaria Koch MD    Chief Complaint   Patient presents with   • Follow-up     GERD   Patient with bloating and some bowel changes.    History of Present Illness:   HPI  This was a telephone visit.  The patient consented for telephone visit.  Mrs. Lama states that over the last few months she has noticed some issues with bowel habit changes.  The patient has 1 bowel movement every few days.  She generally would have a bowel movement daily.  There is no history of froy blood in the stool.  Mrs. Lama denies any weight loss.  The patient does admit to bloating of the abdomen.  The patient denies any night sweats, fever or chills.  She has never had a colonoscopy.  There is no family history of colon cancer in first-degree relatives.  Past Medical History:   Diagnosis Date   • Allergic rhinitis    • Anxiety and depression    • Arthritis    • Asthma    • Body piercing     BILATERAL EARS   • Bursitis of left hip     h/o   • Diarrhea    • Fibromyalgia    • GERD (gastroesophageal reflux disease)    • Hypercupremia    • Hypertension    • IBS (irritable bowel syndrome)    • Migraine    • Nausea with vomiting    • PCOS (polycystic ovarian syndrome)    • PONV (postoperative nausea and vomiting)    • Sleep apnea    • Sore throat    • Strep pharyngitis    • Vitamin D deficiency        Past Surgical History:   Procedure Laterality Date   • ABDOMINAL SURGERY     • BREAST SURGERY      biopsy   •  SECTION     • CHOLECYSTECTOMY     • CYSTECTOMY      pilonidal cyst resection   • NISSEN FUNDOPLICATION N/A 10/30/2017    Procedure: NISSEN FUNDOPLICATION LAPAROSCOPIC;  Surgeon: Megha Torres MD;  Location: [facility];  Service:    • OTHER SURGICAL HISTORY       History of Neuroplasty With  Transposition Of Ulnar Nerve - At Elbow   • UPPER GASTROINTESTINAL ENDOSCOPY     • WISDOM TOOTH EXTRACTION           Current Outpatient Medications:   •  albuterol (PROAIR RESPICLICK) 108 (90 BASE) MCG/ACT inhaler, Inhale 1 puff Every 4 (Four) Hours As Needed for Wheezing or Shortness of Air., Disp: 1 inhaler, Rfl: 0  •  amLODIPine (NORVASC) 5 MG tablet, Take 1-2 tablets by mouth every day (Patient taking differently: Take 10 mg by mouth Daily.), Disp: 60 tablet, Rfl: 6  •  ARIPiprazole (ABILIFY) 5 MG tablet, Take 1 tablet by mouth Daily., Disp: 30 tablet, Rfl: 5  •  carvedilol (COREG) 3.125 MG tablet, Take 1 tablet by mouth 2 (two) times a day., Disp: 60 tablet, Rfl: 5  •  Cetirizine HCl (ZYRTEC ALLERGY) 10 MG capsule, Take  by mouth Daily As Needed., Disp: , Rfl:   •  Cholecalciferol (VITAMIN D3) 2000 UNITS tablet, Take 5,000 Units by mouth 3 (Three) Times a Day., Disp: , Rfl:   •  dexlansoprazole (DEXILANT) 60 MG capsule, Take 1 capsule by mouth Daily., Disp: 30 capsule, Rfl: 3  •  doxycycline (VIBRAMYCIN) 100 MG capsule, Take 1 capsule by mouth Daily. (Patient taking differently: Take 100 mg by mouth Every Other Day.), Disp: 30 capsule, Rfl: 5  •  DULoxetine (CYMBALTA) 60 MG capsule, Take 1 capsule by mouth Daily., Disp: 30 capsule, Rfl: 0  •  eszopiclone (LUNESTA) 3 MG tablet, Take 1 tablet by mouth At Night As Needed for Sleep, Disp: 30 tablet, Rfl: 2  •  galcanezumab-gnlm (Emgality) 120 MG/ML prefilled syringe, Inject 1 Milliliter under the skin once a month, Disp: 1 mL, Rfl: 5  •  lisdexamfetamine (Vyvanse) 40 MG capsule, Take 1 capsule by mouth Daily, Disp: 30 capsule, Rfl: 0  •  meloxicam (MOBIC) 15 MG tablet, Take 1 tablet by mouth Daily., Disp: 30 tablet, Rfl: 5  •  metFORMIN (GLUCOPHAGE) 500 MG tablet, Take 1 tablet by mouth 2 (two) times a day., Disp: 180 tablet, Rfl: 0  •  montelukast (SINGULAIR) 10 MG tablet, Take 1 tablet by mouth every night at bedtime., Disp: 90 tablet, Rfl: 3  •  OXcarbazepine  (TRILEPTAL) 300 MG tablet, Take 1 tablet by mouth in the morning and 2 tablets in the evening, Disp: 270 tablet, Rfl: 1  •  OXcarbazepine (TRILEPTAL) 300 MG tablet, Take 1 tablet by mouth every morning, and Take 2 tablets by mouth every night at bedtime, Disp: 270 tablet, Rfl: 1  •  OXcarbazepine (TRILEPTAL) 300 MG tablet, Take 1 tablet by mouth in the morning and 2 tablets by mouth at night, Disp: 270 tablet, Rfl: 1  •  pregabalin (LYRICA) 100 MG capsule, Take 1 capsule by mouth 2 (two) times a day., Disp: 180 capsule, Rfl: 1  •  pregabalin (LYRICA) 100 MG capsule, Take 1 capsule by mouth 2 (two) times a day., Disp: 180 capsule, Rfl: 0  •  spironolactone (ALDACTONE) 25 MG tablet, Take 2 tablets by mouth daily., Disp: 60 tablet, Rfl: 5  •  tiZANidine (ZANAFLEX) 4 MG tablet, Take 1 tablet by mouth 3 times daily as needed, Disp: 90 tablet, Rfl: 0  •  albuterol sulfate  (90 Base) MCG/ACT inhaler, Inhale 2 puffs by mouth every 6-8 hours as needed., Disp: 18 g, Rfl: 3  •  amLODIPine (NORVASC) 5 MG tablet, Take 1-2 tablets by mouth every day, Disp: 60 tablet, Rfl: 3  •  amLODIPine (NORVASC) 5 MG tablet, Take 1 to 2 tablets by mouth daily, Disp: 60 tablet, Rfl: 6  •  DULoxetine (CYMBALTA) 30 MG capsule, Take 1 capsule by mouth Daily., Disp: 30 capsule, Rfl: 1  •  DULoxetine (CYMBALTA) 60 MG capsule, Take 1 capsule by mouth Daily., Disp: 30 capsule, Rfl: 6  •  DULoxetine (CYMBALTA) 60 MG capsule, Take 1 capsule by mouth Daily., Disp: 30 capsule, Rfl: 6  •  eszopiclone (LUNESTA) 2 MG tablet, Take 1 tablet by mouth At Night As Needed. Take immediately before bedtime (Patient taking differently: Take 3 mg by mouth At Night As Needed. Take immediately before bedtime), Disp: 30 tablet, Rfl: 0  •  losartan (COZAAR) 25 MG tablet, Take 1 tablet by mouth Daily., Disp: 30 tablet, Rfl: 2  •  montelukast (SINGULAIR) 10 MG tablet, Take 1 tablet by mouth Daily., Disp: 90 tablet, Rfl: 1  •  norethindrone-ethinyl estradiol  (MICROGESTIN 1/20) 1-20 MG-MCG per tablet, Take 1 tablet by mouth once daily., Disp: 21 tablet, Rfl: 5  •  OXcarbazepine (TRILEPTAL) 300 MG tablet, Take 1 tablet by mouth in the morning and 2 tablets by mouth at bedtime, Disp: 270 tablet, Rfl: 1  •  OXcarbazepine (TRILEPTAL) 300 MG tablet, Take 1 tablet by mouth 3 (Three) Times a Day., Disp: 90 tablet, Rfl: 1  •  PROAIR RESPICLICK 108 (90 Base) MCG/ACT inhaler, Inhale 2 puffs by mouth every 6 to 8 hours as needed, Disp: 1 inhaler, Rfl: 0  •  sucralfate (CARAFATE) 1 g tablet, Take 1 tablet by mouth 4 times daily, before meals and at bedtime, Disp: 56 tablet, Rfl: 0  •  temazepam (RESTORIL) 15 MG capsule, Take 1-2 capsules by mouth every night at bedtime as needed for sleep, Disp: 60 capsule, Rfl: 2  •  tetrahydrozoline-zinc (VISINE-AC) 0.05-0.25 % ophthalmic solution, take 1 drop in each eye once a day, Disp: , Rfl:   •  Zinc 50 MG tablet, Take 1 tablet by mouth Daily., Disp: , Rfl:     Allergies   Allergen Reactions   • Minocycline    • Nifedipine Other (See Comments)     Chest discomfort, headache, nausea   • Rifampin    • Sulfamethoxazole-Trimethoprim        Family History   Problem Relation Age of Onset   • Arthritis Father    • Diabetes Father    • Hyperlipidemia Father    • Hypertension Father    • Kidney disease Father    • ABBE disease Father    • Thyroid cancer Father    • Cancer Maternal Aunt         Lung   • Cancer Maternal Uncle         Lung   • Stroke Paternal Aunt    • Alzheimer's disease Maternal Grandmother    • Stroke Maternal Grandmother    • Other Maternal Grandfather         pacemaker   • Arthritis Paternal Grandmother    • Hypertension Paternal Grandmother    • Heart attack Paternal Grandfather    • Alzheimer's disease Paternal Grandfather    • Arthritis Paternal Grandfather    • Hypertension Paternal Grandfather    • Cancer Other    • Colon cancer Cousin        Social History     Socioeconomic History   • Marital status:      Spouse name:  Not on file   • Number of children: Not on file   • Years of education: Not on file   • Highest education level: Not on file   Tobacco Use   • Smoking status: Never Smoker   • Smokeless tobacco: Never Used   Substance and Sexual Activity   • Alcohol use: Yes     Comment: ocassionally   • Drug use: No     Comment: naproxen   • Sexual activity: Defer       Review of Systems   Constitutional: Negative for activity change, appetite change, fatigue, fever and unexpected weight change.   HENT: Negative for dental problem, hearing loss, mouth sores, postnasal drip, sneezing, trouble swallowing and voice change.    Eyes: Negative for pain, redness, itching and visual disturbance.   Respiratory: Negative for cough, choking, chest tightness, shortness of breath and wheezing.    Cardiovascular: Negative for chest pain, palpitations and leg swelling.   Gastrointestinal: Positive for abdominal distention (gas, rumbling in stomach) and constipation. Negative for abdominal pain, anal bleeding, blood in stool, diarrhea, nausea, rectal pain and vomiting.   Endocrine: Negative for cold intolerance, heat intolerance, polydipsia, polyphagia and polyuria.   Genitourinary: Negative.  Negative for dysuria, enuresis, flank pain, hematuria and urgency.   Musculoskeletal: Negative for arthralgias, back pain, gait problem, joint swelling and myalgias.   Skin: Negative for color change, pallor and rash.   Allergic/Immunologic: Negative for environmental allergies, food allergies and immunocompromised state.   Neurological: Negative for dizziness, tremors, seizures, facial asymmetry, speech difficulty, numbness and headaches.   Hematological: Negative for adenopathy.   Psychiatric/Behavioral: Negative for behavioral problems, confusion, dysphoric mood, hallucinations and self-injury.       There were no vitals filed for this visit.    Physical Exam    Diagnoses and all orders for this visit:    1. Change in bowel habits (Primary)    2.  Bloating    3. Constipation, unspecified constipation type    The patient is over age 45 and with bowel habit changes further investigation with colonoscopy is warranted.  The differential diagnosis includes IBS, colorectal polyps, IBD and pelvic floor dyssynergia.      Plan: Will proceed with a colonoscopy for further evaluation.        This was a telephone visit for 10 minutes.

## 2021-03-22 DIAGNOSIS — Z12.11 SCREENING FOR COLON CANCER: Primary | ICD-10-CM

## 2021-03-22 RX ORDER — SODIUM, POTASSIUM,MAG SULFATES 17.5-3.13G
1 SOLUTION, RECONSTITUTED, ORAL ORAL TAKE AS DIRECTED
Qty: 354 ML | Refills: 0 | Status: SHIPPED | OUTPATIENT
Start: 2021-03-22

## 2021-03-24 ENCOUNTER — TRANSCRIBE ORDERS (OUTPATIENT)
Dept: ADMINISTRATIVE | Facility: HOSPITAL | Age: 46
End: 2021-03-24

## 2021-03-24 DIAGNOSIS — Z12.31 VISIT FOR SCREENING MAMMOGRAM: Primary | ICD-10-CM

## 2021-04-01 ENCOUNTER — OUTSIDE FACILITY SERVICE (OUTPATIENT)
Dept: GASTROENTEROLOGY | Facility: CLINIC | Age: 46
End: 2021-04-01

## 2021-04-01 PROCEDURE — 45378 DIAGNOSTIC COLONOSCOPY: CPT | Performed by: INTERNAL MEDICINE

## 2021-04-27 ENCOUNTER — PATIENT MESSAGE (OUTPATIENT)
Dept: GASTROENTEROLOGY | Facility: CLINIC | Age: 46
End: 2021-04-27

## 2021-04-27 NOTE — TELEPHONE ENCOUNTER
From: Honey Lama  To: Rodrick Singh MD  Sent: 4/27/2021 12:09 PM EDT  Subject: Non-Urgent Medical Question    Hello,     I received a bill for $930 for a diagnostic colonoscopy. Can you change to a screening? The changes I had mentioned to you had been going on since my fundiplication Oct. 2017. Not new, just more gas. Please let me know if you can rebill.     Thank you!     Honey Lama

## 2021-05-14 ENCOUNTER — PATIENT MESSAGE (OUTPATIENT)
Dept: GASTROENTEROLOGY | Facility: CLINIC | Age: 46
End: 2021-05-14

## 2021-05-14 NOTE — TELEPHONE ENCOUNTER
From: Honey Lama  To: Rodrick Singh MD  Sent: 5/14/2021 2:53 PM EDT  Subject: Complaint    Has anyone spoken with Dr. Singh about adjusting colonoscopy to screening? See other msgs. It has been a few days without a response. I need to know something.     Thank you!

## 2021-05-26 ENCOUNTER — TRANSCRIBE ORDERS (OUTPATIENT)
Dept: LAB | Facility: HOSPITAL | Age: 46
End: 2021-05-26

## 2021-05-26 ENCOUNTER — LAB (OUTPATIENT)
Dept: LAB | Facility: HOSPITAL | Age: 46
End: 2021-05-26

## 2021-05-26 ENCOUNTER — HOSPITAL ENCOUNTER (OUTPATIENT)
Dept: MAMMOGRAPHY | Facility: HOSPITAL | Age: 46
Discharge: HOME | End: 2021-05-26
Admitting: FAMILY MEDICINE

## 2021-05-26 DIAGNOSIS — Z12.31 VISIT FOR SCREENING MAMMOGRAM: ICD-10-CM

## 2021-05-26 DIAGNOSIS — R94.6 NONSPECIFIC ABNORMAL RESULTS OF THYROID FUNCTION STUDY: ICD-10-CM

## 2021-05-26 DIAGNOSIS — R94.6 NONSPECIFIC ABNORMAL RESULTS OF THYROID FUNCTION STUDY: Primary | ICD-10-CM

## 2021-05-26 LAB
T3 SERPL-MCNC: 80.7 NG/DL (ref 80–200)
T4 FREE SERPL-MCNC: 0.74 NG/DL (ref 0.93–1.7)
TSH SERPL DL<=0.05 MIU/L-ACNC: 2.14 UIU/ML (ref 0.27–4.2)

## 2021-05-26 PROCEDURE — 77063 BREAST TOMOSYNTHESIS BI: CPT

## 2021-05-26 PROCEDURE — 36415 COLL VENOUS BLD VENIPUNCTURE: CPT

## 2021-05-26 PROCEDURE — 77067 SCR MAMMO BI INCL CAD: CPT

## 2021-05-26 PROCEDURE — 84443 ASSAY THYROID STIM HORMONE: CPT

## 2021-05-26 PROCEDURE — 84480 ASSAY TRIIODOTHYRONINE (T3): CPT

## 2021-05-26 PROCEDURE — 84439 ASSAY OF FREE THYROXINE: CPT

## 2021-08-05 RX ORDER — DOXYCYCLINE HYCLATE 100 MG/1
100 CAPSULE ORAL DAILY
Qty: 30 CAPSULE | Refills: 5 | OUTPATIENT
Start: 2021-08-05

## 2021-08-25 ENCOUNTER — LAB (OUTPATIENT)
Dept: LAB | Facility: HOSPITAL | Age: 46
End: 2021-08-25

## 2021-08-25 ENCOUNTER — TRANSCRIBE ORDERS (OUTPATIENT)
Dept: LAB | Facility: HOSPITAL | Age: 46
End: 2021-08-25

## 2021-08-25 DIAGNOSIS — R94.6 NONSPECIFIC ABNORMAL RESULTS OF THYROID FUNCTION STUDY: ICD-10-CM

## 2021-08-25 DIAGNOSIS — R94.6 NONSPECIFIC ABNORMAL RESULTS OF THYROID FUNCTION STUDY: Primary | ICD-10-CM

## 2021-08-25 PROCEDURE — 84443 ASSAY THYROID STIM HORMONE: CPT

## 2021-08-25 PROCEDURE — 36415 COLL VENOUS BLD VENIPUNCTURE: CPT

## 2021-08-25 PROCEDURE — 84480 ASSAY TRIIODOTHYRONINE (T3): CPT

## 2021-08-25 PROCEDURE — 84439 ASSAY OF FREE THYROXINE: CPT

## 2021-08-26 LAB
T3 SERPL-MCNC: 102 NG/DL (ref 80–200)
T4 FREE SERPL-MCNC: 0.93 NG/DL (ref 0.93–1.7)
TSH SERPL DL<=0.05 MIU/L-ACNC: 0.93 UIU/ML (ref 0.27–4.2)

## 2021-09-07 ENCOUNTER — OFFICE VISIT (OUTPATIENT)
Dept: PULMONOLOGY | Facility: CLINIC | Age: 46
End: 2021-09-07

## 2021-09-07 VITALS
WEIGHT: 160 LBS | DIASTOLIC BLOOD PRESSURE: 78 MMHG | HEIGHT: 60 IN | RESPIRATION RATE: 16 BRPM | OXYGEN SATURATION: 96 % | HEART RATE: 111 BPM | SYSTOLIC BLOOD PRESSURE: 116 MMHG | BODY MASS INDEX: 31.41 KG/M2

## 2021-09-07 DIAGNOSIS — J30.89 OTHER ALLERGIC RHINITIS: ICD-10-CM

## 2021-09-07 DIAGNOSIS — R06.83 SNORING: ICD-10-CM

## 2021-09-07 DIAGNOSIS — G47.00 INSOMNIA, UNSPECIFIED TYPE: ICD-10-CM

## 2021-09-07 DIAGNOSIS — E66.9 OBESITY (BMI 30-39.9): ICD-10-CM

## 2021-09-07 DIAGNOSIS — G47.33 OBSTRUCTIVE SLEEP APNEA: Primary | ICD-10-CM

## 2021-09-07 PROCEDURE — 99204 OFFICE O/P NEW MOD 45 MIN: CPT | Performed by: INTERNAL MEDICINE

## 2021-09-07 NOTE — PROGRESS NOTES
CONSULT NOTE    Requested by:   Annamaria Koch MD Eidson, Kasey M, MD      Chief Complaint   Patient presents with   • Consult   • Sleeping Problem       Subjective:  Honey Lama is a 46 y.o. female.     History of Present Illness   Patient comes in today for consultation because of recent diagnosis of obstructive sleep apnea.    The patient says that she was diagnosed with sleep apnea 8-9 months ago and has had a CPAP device for more than 7 months.      She says that she has had multiple issues with sleep in the past and is currently on Lunesta.  She takes Lunesta around 8 PM and then goes to bed around 9 PM but can barely sleep for more than 3 hours before waking up again.      She feels that her CPAP device causes some issues, especially with her current mask.    She does drink 2 cups coffee and 1/2 a can of soda per day.     Patient says that she has been using her nasal sprays on a regular basis and describes no significant ongoing issues other than occasional congestion.       The following portions of the patient's history were reviewed and updated as appropriate: allergies, current medications, past family history, past medical history, past social history and past surgical history.    Review of Systems   Constitutional: Positive for fatigue. Negative for chills and fever.   HENT: Negative for rhinorrhea, sinus pressure, sinus pain, sneezing and sore throat.    Respiratory: Negative for cough, chest tightness, shortness of breath and wheezing.    Cardiovascular: Positive for leg swelling. Negative for palpitations.   Psychiatric/Behavioral: Positive for sleep disturbance.   All other systems reviewed and are negative.      Past Medical History:   Diagnosis Date   • Allergic rhinitis    • Anxiety and depression    • Arthritis    • Asthma    • Body piercing     BILATERAL EARS   • Bursitis of left hip     h/o   • Diarrhea    • Fibromyalgia    • GERD (gastroesophageal reflux disease)    •  "Hypercupremia    • Hypertension    • IBS (irritable bowel syndrome)    • Migraine    • Nausea with vomiting    • PCOS (polycystic ovarian syndrome)    • PONV (postoperative nausea and vomiting)    • Sleep apnea    • Sore throat    • Strep pharyngitis    • Vitamin D deficiency        Social History     Tobacco Use   • Smoking status: Never Smoker   • Smokeless tobacco: Never Used   Substance Use Topics   • Alcohol use: Yes     Comment: ocassionally         Objective:  Visit Vitals  /78   Pulse 111   Resp 16   Ht 152.4 cm (60\")   Wt 72.6 kg (160 lb)   SpO2 96%   BMI 31.25 kg/m²       Physical Exam  Vitals reviewed.   Constitutional:       Appearance: She is well-developed.   HENT:      Head: Atraumatic.      Mouth/Throat:      Comments: Oropharynx was crowded.  Eyes:      Pupils: Pupils are equal, round, and reactive to light.   Neck:      Thyroid: No thyromegaly.      Vascular: No JVD.      Trachea: No tracheal deviation.   Cardiovascular:      Rate and Rhythm: Normal rate and regular rhythm.   Pulmonary:      Effort: Pulmonary effort is normal. No respiratory distress.      Breath sounds: Normal breath sounds. No wheezing.   Musculoskeletal:         General: Normal range of motion.      Comments: Gait was normal.   Skin:     General: Skin is warm and dry.   Neurological:      Mental Status: She is alert and oriented to person, place, and time.   Psychiatric:         Behavior: Behavior normal.           Assessment/Plan:  Diagnoses and all orders for this visit:    1. Obstructive sleep apnea (Primary)    2. Snoring    3. Other allergic rhinitis    4. Obesity (BMI 30-39.9)    5. Insomnia, unspecified type        Return in about 4 months (around 1/7/2022) for SleepBAKARI/Eulalia.    DISCUSSION(if any):  Laboratory workup was also reviewed which showed   Lab Results   Component Value Date    CO2 28.1 09/26/2020     Laboratory workup also showed   Lab Results   Component Value Date    HGB 13.1 09/26/2020    HGB 12.8 " 07/03/2019    HGB 12.9 05/17/2018       Referring physicians office note was also reviewed that did mention sleep apnea    ===========================  ===========================    I was able to review her last sleep study, from Jan 2021.  It showed that the patient has significant sleep apnea and her AHI was 10.5/hour.  This was a home sleep study.    The home sleep study also showed poor sleep efficiency with sleep time of less than 4 hours.    I told the patient that her symptoms are consistent with poorly controlled sleep apnea.    The patient appears to have issues with getting used to the mask at night.  I recommended that she uses the mask for 15-30 minutes every day, to get used to it.    Continue treatment with PAP, with Air Fit F30 full face mask.    If the symptoms do not improve, even after above-mentioned measures, then she may have to undergo a full night titration study.    Patient was advised to call this office with any issues.    I have encouraged the patient to call or schedule a visit earlier, if there are any concerns.    Sleep hygiene measures were discussed with the patient in great detail.    The patient was told that she will need to follow a strict time to go to bed as well as a fixed time to get out of bed.    Multiple measures were discussed including sleep restriction and she was strongly encouraged to follow other recommendations including avoiding naps, watching TV in the bed etc.    Due to a fair response to the current therapy, we will continue her on Lunesta, with instructions to take it close to bed time rather than 8 PM.    Side effects of prescribed medication were discussed.    Jeffry was reviewed.    Patient was advised to continue her nasal spray, especially given improvement in symptoms overall.      Dictated utilizing Dragon dictation.    This document was electronically signed by Katt Gutierrez MD on 09/07/21 at 15:24 EDT

## 2021-09-13 ENCOUNTER — PATIENT ROUNDING (BHMG ONLY) (OUTPATIENT)
Dept: PULMONOLOGY | Facility: CLINIC | Age: 46
End: 2021-09-13

## 2021-09-13 NOTE — PROGRESS NOTES
September 13, 2021    Hello, may I speak with Honey Lama?    My name is Latasha      I am  with [facility]  [address] [address] [phone].    Before we get started may I verify your date of birth? 1975    I am calling to officially welcome you to our practice and ask about your recent visit. Is this a good time to talk? No, voicemail left.

## 2021-11-10 ENCOUNTER — LAB (OUTPATIENT)
Dept: LAB | Facility: HOSPITAL | Age: 46
End: 2021-11-10

## 2021-11-10 ENCOUNTER — TRANSCRIBE ORDERS (OUTPATIENT)
Dept: LAB | Facility: HOSPITAL | Age: 46
End: 2021-11-10

## 2021-11-10 DIAGNOSIS — E03.8 HYPOTHYROIDISM DUE TO FIBROUS INVASIVE THYROIDITIS: ICD-10-CM

## 2021-11-10 DIAGNOSIS — E28.2 POLYCYSTIC OVARIES: ICD-10-CM

## 2021-11-10 DIAGNOSIS — E06.5 HYPOTHYROIDISM DUE TO FIBROUS INVASIVE THYROIDITIS: ICD-10-CM

## 2021-11-10 DIAGNOSIS — E53.8 BIOTIN-(PROPIONYL-COA-CARBOXYLASE) LIGASE DEFICIENCY: ICD-10-CM

## 2021-11-10 DIAGNOSIS — E55.9 AVITAMINOSIS D: ICD-10-CM

## 2021-11-10 DIAGNOSIS — Z00.00 ROUTINE GENERAL MEDICAL EXAMINATION AT A HEALTH CARE FACILITY: ICD-10-CM

## 2021-11-10 DIAGNOSIS — Z00.00 ROUTINE GENERAL MEDICAL EXAMINATION AT A HEALTH CARE FACILITY: Primary | ICD-10-CM

## 2021-11-10 LAB
DEPRECATED RDW RBC AUTO: 39.5 FL (ref 37–54)
ERYTHROCYTE [DISTWIDTH] IN BLOOD BY AUTOMATED COUNT: 13.6 % (ref 12.3–15.4)
HCT VFR BLD AUTO: 38.1 % (ref 34–46.6)
HGB BLD-MCNC: 12.9 G/DL (ref 12–15.9)
MCH RBC QN AUTO: 27.5 PG (ref 26.6–33)
MCHC RBC AUTO-ENTMCNC: 33.9 G/DL (ref 31.5–35.7)
MCV RBC AUTO: 81.2 FL (ref 79–97)
PLATELET # BLD AUTO: 329 10*3/MM3 (ref 140–450)
PMV BLD AUTO: 9.8 FL (ref 6–12)
RBC # BLD AUTO: 4.69 10*6/MM3 (ref 3.77–5.28)
WBC # BLD AUTO: 10.47 10*3/MM3 (ref 3.4–10.8)

## 2021-11-10 PROCEDURE — 84443 ASSAY THYROID STIM HORMONE: CPT

## 2021-11-10 PROCEDURE — 82607 VITAMIN B-12: CPT

## 2021-11-10 PROCEDURE — 84402 ASSAY OF FREE TESTOSTERONE: CPT

## 2021-11-10 PROCEDURE — 82306 VITAMIN D 25 HYDROXY: CPT

## 2021-11-10 PROCEDURE — 84439 ASSAY OF FREE THYROXINE: CPT

## 2021-11-10 PROCEDURE — 83036 HEMOGLOBIN GLYCOSYLATED A1C: CPT

## 2021-11-10 PROCEDURE — 36415 COLL VENOUS BLD VENIPUNCTURE: CPT

## 2021-11-10 PROCEDURE — 84403 ASSAY OF TOTAL TESTOSTERONE: CPT

## 2021-11-10 PROCEDURE — 80053 COMPREHEN METABOLIC PANEL: CPT

## 2021-11-10 PROCEDURE — 85027 COMPLETE CBC AUTOMATED: CPT

## 2021-11-11 LAB
25(OH)D3 SERPL-MCNC: 41.1 NG/ML
ALBUMIN SERPL-MCNC: 4.7 G/DL (ref 3.5–5.2)
ALBUMIN/GLOB SERPL: 1.6 G/DL
ALP SERPL-CCNC: 113 U/L (ref 39–117)
ALT SERPL W P-5'-P-CCNC: 16 U/L (ref 1–33)
ANION GAP SERPL CALCULATED.3IONS-SCNC: 7.9 MMOL/L (ref 5–15)
AST SERPL-CCNC: 21 U/L (ref 1–32)
BILIRUB SERPL-MCNC: <0.2 MG/DL (ref 0–1.2)
BUN SERPL-MCNC: 15 MG/DL (ref 6–20)
BUN/CREAT SERPL: 24.2 (ref 7–25)
CALCIUM SPEC-SCNC: 9.3 MG/DL (ref 8.6–10.5)
CHLORIDE SERPL-SCNC: 99 MMOL/L (ref 98–107)
CO2 SERPL-SCNC: 29.1 MMOL/L (ref 22–29)
CREAT SERPL-MCNC: 0.62 MG/DL (ref 0.57–1)
GFR SERPL CREATININE-BSD FRML MDRD: 104 ML/MIN/1.73
GLOBULIN UR ELPH-MCNC: 2.9 GM/DL
GLUCOSE SERPL-MCNC: 80 MG/DL (ref 65–99)
HBA1C MFR BLD: 5.51 % (ref 4.8–5.6)
POTASSIUM SERPL-SCNC: 3.9 MMOL/L (ref 3.5–5.2)
PROT SERPL-MCNC: 7.6 G/DL (ref 6–8.5)
SODIUM SERPL-SCNC: 136 MMOL/L (ref 136–145)
T4 FREE SERPL-MCNC: 0.89 NG/DL (ref 0.93–1.7)
TSH SERPL DL<=0.05 MIU/L-ACNC: 1.91 UIU/ML (ref 0.27–4.2)
VIT B12 BLD-MCNC: 1688 PG/ML (ref 211–946)

## 2021-11-16 LAB
TESTOST FREE SERPL-MCNC: 0.9 PG/ML (ref 0–4.2)
TESTOST SERPL-MCNC: 23 NG/DL

## 2021-12-23 ENCOUNTER — TRANSCRIBE ORDERS (OUTPATIENT)
Dept: ADMINISTRATIVE | Facility: HOSPITAL | Age: 46
End: 2021-12-23

## 2021-12-23 DIAGNOSIS — Z12.31 VISIT FOR SCREENING MAMMOGRAM: Primary | ICD-10-CM

## 2022-06-01 ENCOUNTER — HOSPITAL ENCOUNTER (OUTPATIENT)
Dept: MAMMOGRAPHY | Facility: HOSPITAL | Age: 47
Discharge: HOME | End: 2022-06-01
Admitting: FAMILY MEDICINE

## 2022-06-01 DIAGNOSIS — Z12.31 VISIT FOR SCREENING MAMMOGRAM: ICD-10-CM

## 2022-06-01 PROCEDURE — 77063 BREAST TOMOSYNTHESIS BI: CPT

## 2022-06-01 PROCEDURE — 77067 SCR MAMMO BI INCL CAD: CPT

## 2022-06-09 ENCOUNTER — TRANSCRIBE ORDERS (OUTPATIENT)
Dept: ADMINISTRATIVE | Facility: HOSPITAL | Age: 47
End: 2022-06-09

## 2022-06-09 DIAGNOSIS — R92.8 ABNORMAL MAMMOGRAM: Primary | ICD-10-CM

## 2022-07-21 ENCOUNTER — HOSPITAL ENCOUNTER (OUTPATIENT)
Dept: ULTRASOUND IMAGING | Facility: HOSPITAL | Age: 47
Discharge: HOME | End: 2022-07-21

## 2022-07-21 ENCOUNTER — HOSPITAL ENCOUNTER (OUTPATIENT)
Dept: MAMMOGRAPHY | Facility: HOSPITAL | Age: 47
Discharge: HOME | End: 2022-07-21

## 2022-07-21 DIAGNOSIS — R92.8 ABNORMAL MAMMOGRAM: ICD-10-CM

## 2022-07-21 PROCEDURE — 76642 ULTRASOUND BREAST LIMITED: CPT

## 2022-07-21 PROCEDURE — 77065 DX MAMMO INCL CAD UNI: CPT

## 2022-07-21 PROCEDURE — G0279 TOMOSYNTHESIS, MAMMO: HCPCS

## 2022-07-27 ENCOUNTER — TRANSCRIBE ORDERS (OUTPATIENT)
Dept: LAB | Facility: HOSPITAL | Age: 47
End: 2022-07-27

## 2022-07-27 ENCOUNTER — LAB (OUTPATIENT)
Dept: LAB | Facility: HOSPITAL | Age: 47
End: 2022-07-27

## 2022-07-27 DIAGNOSIS — R70.0 ELEVATED SEDIMENTATION RATE: Primary | ICD-10-CM

## 2022-07-27 DIAGNOSIS — R70.0 ELEVATED SEDIMENTATION RATE: ICD-10-CM

## 2022-07-27 LAB — ERYTHROCYTE [SEDIMENTATION RATE] IN BLOOD: 16 MM/HR (ref 0–20)

## 2022-07-27 PROCEDURE — 36415 COLL VENOUS BLD VENIPUNCTURE: CPT

## 2022-07-27 PROCEDURE — 85652 RBC SED RATE AUTOMATED: CPT

## 2022-07-27 PROCEDURE — 86140 C-REACTIVE PROTEIN: CPT

## 2022-07-27 PROCEDURE — 86618 LYME DISEASE ANTIBODY: CPT

## 2022-07-28 LAB
B BURGDOR IGG+IGM SER QL IA: NEGATIVE
CRP SERPL-MCNC: 0.82 MG/DL (ref 0–0.5)

## 2022-08-29 ENCOUNTER — LAB (OUTPATIENT)
Dept: LAB | Facility: HOSPITAL | Age: 47
End: 2022-08-29

## 2022-08-29 ENCOUNTER — TRANSCRIBE ORDERS (OUTPATIENT)
Dept: LAB | Facility: HOSPITAL | Age: 47
End: 2022-08-29

## 2022-08-29 DIAGNOSIS — Z00.00 ROUTINE GENERAL MEDICAL EXAMINATION AT A HEALTH CARE FACILITY: Primary | ICD-10-CM

## 2022-08-29 DIAGNOSIS — E53.8 BIOTIN-(PROPIONYL-COA-CARBOXYLASE) LIGASE DEFICIENCY: ICD-10-CM

## 2022-08-29 DIAGNOSIS — E55.9 AVITAMINOSIS D: ICD-10-CM

## 2022-08-29 DIAGNOSIS — E05.00 TOXIC DIFFUSE GOITER WITH PRETIBIAL MYXEDEMA: ICD-10-CM

## 2022-08-29 DIAGNOSIS — E03.8 TOXIC DIFFUSE GOITER WITH PRETIBIAL MYXEDEMA: ICD-10-CM

## 2022-08-29 DIAGNOSIS — D50.0 IRON DEFICIENCY ANEMIA SECONDARY TO BLOOD LOSS (CHRONIC): ICD-10-CM

## 2022-08-29 DIAGNOSIS — Z00.00 ROUTINE GENERAL MEDICAL EXAMINATION AT A HEALTH CARE FACILITY: ICD-10-CM

## 2022-08-29 LAB
25(OH)D3 SERPL-MCNC: 56.4 NG/ML (ref 30–100)
ALBUMIN SERPL-MCNC: 4.7 G/DL (ref 3.5–5.2)
ALBUMIN/GLOB SERPL: 2.1 G/DL
ALP SERPL-CCNC: 108 U/L (ref 39–117)
ALT SERPL W P-5'-P-CCNC: 8 U/L (ref 1–33)
ANION GAP SERPL CALCULATED.3IONS-SCNC: 11 MMOL/L (ref 5–15)
AST SERPL-CCNC: 13 U/L (ref 1–32)
BILIRUB SERPL-MCNC: 0.2 MG/DL (ref 0–1.2)
BUN SERPL-MCNC: 14 MG/DL (ref 6–20)
BUN/CREAT SERPL: 19.4 (ref 7–25)
CALCIUM SPEC-SCNC: 9.4 MG/DL (ref 8.6–10.5)
CHLORIDE SERPL-SCNC: 101 MMOL/L (ref 98–107)
CHOLEST SERPL-MCNC: 189 MG/DL (ref 0–200)
CO2 SERPL-SCNC: 26 MMOL/L (ref 22–29)
CREAT SERPL-MCNC: 0.72 MG/DL (ref 0.57–1)
DEPRECATED RDW RBC AUTO: 42.5 FL (ref 37–54)
EGFRCR SERPLBLD CKD-EPI 2021: 103.9 ML/MIN/1.73
ERYTHROCYTE [DISTWIDTH] IN BLOOD BY AUTOMATED COUNT: 14.1 % (ref 12.3–15.4)
FERRITIN SERPL-MCNC: 13.2 NG/ML (ref 13–150)
GLOBULIN UR ELPH-MCNC: 2.2 GM/DL
GLUCOSE SERPL-MCNC: 83 MG/DL (ref 65–99)
HCT VFR BLD AUTO: 36.5 % (ref 34–46.6)
HDLC SERPL-MCNC: 86 MG/DL (ref 40–60)
HGB BLD-MCNC: 12 G/DL (ref 12–15.9)
IRON 24H UR-MRATE: 58 MCG/DL (ref 37–145)
IRON SATN MFR SERPL: 11 % (ref 20–50)
LDLC SERPL CALC-MCNC: 86 MG/DL (ref 0–100)
LDLC/HDLC SERPL: 0.98 {RATIO}
MCH RBC QN AUTO: 27.4 PG (ref 26.6–33)
MCHC RBC AUTO-ENTMCNC: 32.9 G/DL (ref 31.5–35.7)
MCV RBC AUTO: 83.3 FL (ref 79–97)
PLATELET # BLD AUTO: 368 10*3/MM3 (ref 140–450)
PMV BLD AUTO: 9.6 FL (ref 6–12)
POTASSIUM SERPL-SCNC: 3.9 MMOL/L (ref 3.5–5.2)
PROT SERPL-MCNC: 6.9 G/DL (ref 6–8.5)
RBC # BLD AUTO: 4.38 10*6/MM3 (ref 3.77–5.28)
SODIUM SERPL-SCNC: 138 MMOL/L (ref 136–145)
T4 FREE SERPL-MCNC: 1.29 NG/DL (ref 0.93–1.7)
TIBC SERPL-MCNC: 532 MCG/DL (ref 298–536)
TRANSFERRIN SERPL-MCNC: 357 MG/DL (ref 200–360)
TRIGL SERPL-MCNC: 95 MG/DL (ref 0–150)
TSH SERPL DL<=0.05 MIU/L-ACNC: 1.24 UIU/ML (ref 0.27–4.2)
VIT B12 BLD-MCNC: 547 PG/ML (ref 211–946)
VLDLC SERPL-MCNC: 17 MG/DL (ref 5–40)
WBC NRBC COR # BLD: 6.85 10*3/MM3 (ref 3.4–10.8)

## 2022-08-29 PROCEDURE — 84439 ASSAY OF FREE THYROXINE: CPT

## 2022-08-29 PROCEDURE — 36415 COLL VENOUS BLD VENIPUNCTURE: CPT

## 2022-08-29 PROCEDURE — 83540 ASSAY OF IRON: CPT

## 2022-08-29 PROCEDURE — 82607 VITAMIN B-12: CPT

## 2022-08-29 PROCEDURE — 82728 ASSAY OF FERRITIN: CPT

## 2022-08-29 PROCEDURE — 80053 COMPREHEN METABOLIC PANEL: CPT

## 2022-08-29 PROCEDURE — 85027 COMPLETE CBC AUTOMATED: CPT

## 2022-08-29 PROCEDURE — 84466 ASSAY OF TRANSFERRIN: CPT

## 2022-08-29 PROCEDURE — 84443 ASSAY THYROID STIM HORMONE: CPT

## 2022-08-29 PROCEDURE — 82306 VITAMIN D 25 HYDROXY: CPT

## 2022-08-29 PROCEDURE — 80061 LIPID PANEL: CPT

## 2023-05-10 ENCOUNTER — OFFICE VISIT (OUTPATIENT)
Dept: PULMONOLOGY | Facility: CLINIC | Age: 48
End: 2023-05-10
Payer: COMMERCIAL

## 2023-05-10 VITALS
WEIGHT: 148 LBS | HEART RATE: 114 BPM | DIASTOLIC BLOOD PRESSURE: 66 MMHG | HEIGHT: 61 IN | SYSTOLIC BLOOD PRESSURE: 118 MMHG | OXYGEN SATURATION: 97 % | RESPIRATION RATE: 16 BRPM | BODY MASS INDEX: 27.94 KG/M2

## 2023-05-10 DIAGNOSIS — G47.33 OBSTRUCTIVE SLEEP APNEA: Primary | ICD-10-CM

## 2023-05-10 DIAGNOSIS — G47.00 INSOMNIA, UNSPECIFIED TYPE: ICD-10-CM

## 2023-05-10 PROCEDURE — 99213 OFFICE O/P EST LOW 20 MIN: CPT | Performed by: NURSE PRACTITIONER

## 2023-05-10 RX ORDER — METHYLPHENIDATE HYDROCHLORIDE 20 MG/1
20 TABLET ORAL EVERY MORNING
COMMUNITY
Start: 2023-03-27

## 2023-05-10 NOTE — PROGRESS NOTES
"Chief Complaint   Patient presents with   • Sleeping Problem   • Follow-up         Subjective   Honey Lama is a 48 y.o. female.     History of Present Illness   Patient comes back today for follow up of Obstructive Sleep apnea.     She feels the airflow is too high. She has has her machine about 2 years but is just sharing this information with us. She can use machine about 2 hours before she takes it off.     She has just switched to a nasal pillow that she ordered from [employer] and it seems to be comfortable.     She goes to bed about 8:30 pm and gets up 5 am. She wakes multiple times during the night. She falls asleep easily with temazepam but cannot maintain sleep.     She has tried lunesta also but temazepam works better. This is prescribed by PCP.       The following portions of the patient's history were reviewed and updated as appropriate: allergies, current medications, past family history, past medical history, past social history and past surgical history.    Review of Systems   HENT: Negative for sinus pressure, sneezing and sore throat.    Respiratory: Negative for cough, chest tightness, shortness of breath and wheezing.        Objective   Visit Vitals  /66   Pulse 114   Resp 16   Ht 154.9 cm (61\") Comment: pt reported   Wt 67.1 kg (148 lb)   SpO2 97%   BMI 27.96 kg/m²   HR 92 bpm on recheck        Physical Exam  Vitals reviewed.   Constitutional:       Appearance: She is well-developed.   HENT:      Head: Atraumatic.      Mouth/Throat:      Mouth: Mucous membranes are moist.      Comments: Crowded oropharynx.   Eyes:      Extraocular Movements: Extraocular movements intact.   Cardiovascular:      Rate and Rhythm: Normal rate and regular rhythm.   Musculoskeletal:      Comments: Gait normal.   Skin:     General: Skin is warm.   Neurological:      Mental Status: She is alert and oriented to person, place, and time.           Assessment & Plan   Diagnoses and all orders for this visit:    1. " Obstructive sleep apnea (Primary)  -     BIPAP / CPAP Adjustment    2. Insomnia, unspecified type           Return in about 3 months (around 8/10/2023) for Recheck, For Dr. Gutierrez.    DISCUSSION (if any):  I was able to review her last sleep study, from Jan 2021.  It showed that the patient has significant sleep apnea and her AHI was 10.5/hour.  This was a home sleep study.    I have placed an order for the DME company to decrease AutoPap pressure to 4/10 with a ramp of 4 cm for 30 minutes.    I have advised her to initiate therapy every night and  use therapy as long as possible.    She has mild sleep apnea and if she is intolerant to AutoPap since she may benefit from mandibular advancement device..    MAD discussed if still not tolerant to lower pressure.     Her current compliance only has 3 days of use of the AutoPap pressure 5/15. She is not compliant at this time.     She feels exhausted many days and is motivated to use the machine and feel more rested.    Insomnia is treated by her PCP with temazepam.    Humidification setup, hose and mask care discussed.    Weight loss advised.    Use every night for atleast 4 hours stressed.    A total of 20 minutes was spent reviewing all the information available including reviewing previous history from PCP/sleep specialist, as applicable, reviewing available sleep studies/compliance data.  This also included discussion regarding importance of sleep hygiene and possible lifestyle modifications, if applicable/necessary, that may impact sleep.  Time was also spent documenting information in electronic health record and placing orders, as appropriate and applicable.    Dictated utilizing Dragon dictation.    This document was electronically signed by THAIS Ford May 10, 2023  16:15 EDT

## 2023-09-14 ENCOUNTER — LAB (OUTPATIENT)
Dept: LAB | Facility: HOSPITAL | Age: 48
End: 2023-09-14
Payer: COMMERCIAL

## 2023-09-14 ENCOUNTER — TRANSCRIBE ORDERS (OUTPATIENT)
Dept: LAB | Facility: HOSPITAL | Age: 48
End: 2023-09-14
Payer: COMMERCIAL

## 2023-09-14 DIAGNOSIS — Z00.00 ROUTINE GENERAL MEDICAL EXAMINATION AT A HEALTH CARE FACILITY: ICD-10-CM

## 2023-09-14 DIAGNOSIS — E55.9 VITAMIN D DEFICIENCY: ICD-10-CM

## 2023-09-14 DIAGNOSIS — E05.00 TOXIC DIFFUSE GOITER WITH PRETIBIAL MYXEDEMA: ICD-10-CM

## 2023-09-14 DIAGNOSIS — E53.8 BIOTIN-(PROPIONYL-COA-CARBOXYLASE) LIGASE DEFICIENCY: ICD-10-CM

## 2023-09-14 DIAGNOSIS — Z00.00 ROUTINE GENERAL MEDICAL EXAMINATION AT A HEALTH CARE FACILITY: Primary | ICD-10-CM

## 2023-09-14 DIAGNOSIS — N95.8 POSTARTIFICIAL MENOPAUSAL SYNDROME: ICD-10-CM

## 2023-09-14 DIAGNOSIS — D50.0 IRON DEFICIENCY ANEMIA SECONDARY TO BLOOD LOSS (CHRONIC): ICD-10-CM

## 2023-09-14 DIAGNOSIS — E03.8 TOXIC DIFFUSE GOITER WITH PRETIBIAL MYXEDEMA: ICD-10-CM

## 2023-09-14 DIAGNOSIS — R73.9 BLOOD GLUCOSE ELEVATED: ICD-10-CM

## 2023-09-14 LAB
25(OH)D3 SERPL-MCNC: 44.4 NG/ML (ref 30–100)
ALBUMIN SERPL-MCNC: 4.6 G/DL (ref 3.5–5.2)
ALBUMIN/GLOB SERPL: 1.8 G/DL
ALP SERPL-CCNC: 98 U/L (ref 39–117)
ALT SERPL W P-5'-P-CCNC: 13 U/L (ref 1–33)
ANION GAP SERPL CALCULATED.3IONS-SCNC: 12.1 MMOL/L (ref 5–15)
AST SERPL-CCNC: 16 U/L (ref 1–32)
BILIRUB SERPL-MCNC: <0.2 MG/DL (ref 0–1.2)
BUN SERPL-MCNC: 20 MG/DL (ref 6–20)
BUN/CREAT SERPL: 29.4 (ref 7–25)
CALCIUM SPEC-SCNC: 9.4 MG/DL (ref 8.6–10.5)
CHLORIDE SERPL-SCNC: 102 MMOL/L (ref 98–107)
CHOLEST SERPL-MCNC: 209 MG/DL (ref 0–200)
CO2 SERPL-SCNC: 25.9 MMOL/L (ref 22–29)
CREAT SERPL-MCNC: 0.68 MG/DL (ref 0.57–1)
DEPRECATED RDW RBC AUTO: 38.3 FL (ref 37–54)
EGFRCR SERPLBLD CKD-EPI 2021: 107.6 ML/MIN/1.73
ERYTHROCYTE [DISTWIDTH] IN BLOOD BY AUTOMATED COUNT: 12.1 % (ref 12.3–15.4)
FSH SERPL-ACNC: 98.3 MIU/ML
GLOBULIN UR ELPH-MCNC: 2.6 GM/DL
GLUCOSE SERPL-MCNC: 99 MG/DL (ref 65–99)
HBA1C MFR BLD: 5.5 % (ref 4.8–5.6)
HCT VFR BLD AUTO: 40.6 % (ref 34–46.6)
HDLC SERPL-MCNC: 105 MG/DL (ref 40–60)
HGB BLD-MCNC: 13.8 G/DL (ref 12–15.9)
IRON 24H UR-MRATE: 91 MCG/DL (ref 37–145)
IRON SATN MFR SERPL: 21 % (ref 20–50)
LDLC SERPL CALC-MCNC: 96 MG/DL (ref 0–100)
LDLC/HDLC SERPL: 0.91 {RATIO}
LH SERPL-ACNC: 64.3 MIU/ML
MCH RBC QN AUTO: 29.2 PG (ref 26.6–33)
MCHC RBC AUTO-ENTMCNC: 34 G/DL (ref 31.5–35.7)
MCV RBC AUTO: 86 FL (ref 79–97)
PLATELET # BLD AUTO: 298 10*3/MM3 (ref 140–450)
PMV BLD AUTO: 9.3 FL (ref 6–12)
POTASSIUM SERPL-SCNC: 4.5 MMOL/L (ref 3.5–5.2)
PROT SERPL-MCNC: 7.2 G/DL (ref 6–8.5)
RBC # BLD AUTO: 4.72 10*6/MM3 (ref 3.77–5.28)
SODIUM SERPL-SCNC: 140 MMOL/L (ref 136–145)
T4 FREE SERPL-MCNC: 0.92 NG/DL (ref 0.93–1.7)
TIBC SERPL-MCNC: 431 MCG/DL (ref 298–536)
TRANSFERRIN SERPL-MCNC: 289 MG/DL (ref 200–360)
TRIGL SERPL-MCNC: 42 MG/DL (ref 0–150)
TSH SERPL DL<=0.05 MIU/L-ACNC: 1.36 UIU/ML (ref 0.27–4.2)
VIT B12 BLD-MCNC: >2000 PG/ML (ref 211–946)
VLDLC SERPL-MCNC: 8 MG/DL (ref 5–40)
WBC NRBC COR # BLD: 7.14 10*3/MM3 (ref 3.4–10.8)

## 2023-09-14 PROCEDURE — 36415 COLL VENOUS BLD VENIPUNCTURE: CPT

## 2023-09-14 PROCEDURE — 83036 HEMOGLOBIN GLYCOSYLATED A1C: CPT

## 2023-09-14 PROCEDURE — 84439 ASSAY OF FREE THYROXINE: CPT

## 2023-09-14 PROCEDURE — 80061 LIPID PANEL: CPT

## 2023-09-14 PROCEDURE — 82306 VITAMIN D 25 HYDROXY: CPT

## 2023-09-14 PROCEDURE — 82607 VITAMIN B-12: CPT

## 2023-09-14 PROCEDURE — 80050 GENERAL HEALTH PANEL: CPT

## 2023-09-14 PROCEDURE — 83001 ASSAY OF GONADOTROPIN (FSH): CPT

## 2023-09-14 PROCEDURE — 84466 ASSAY OF TRANSFERRIN: CPT

## 2023-09-14 PROCEDURE — 83525 ASSAY OF INSULIN: CPT

## 2023-09-14 PROCEDURE — 83002 ASSAY OF GONADOTROPIN (LH): CPT

## 2023-09-14 PROCEDURE — 83540 ASSAY OF IRON: CPT

## 2023-09-19 LAB — INSULIN SERPL-ACNC: 6 UIU/ML

## 2023-09-26 ENCOUNTER — TRANSCRIBE ORDERS (OUTPATIENT)
Dept: ADMINISTRATIVE | Facility: HOSPITAL | Age: 48
End: 2023-09-26
Payer: COMMERCIAL

## 2023-09-26 DIAGNOSIS — Z12.31 VISIT FOR SCREENING MAMMOGRAM: Primary | ICD-10-CM

## 2023-12-19 ENCOUNTER — LAB (OUTPATIENT)
Dept: LAB | Facility: HOSPITAL | Age: 48
End: 2023-12-19
Payer: COMMERCIAL

## 2023-12-19 ENCOUNTER — TRANSCRIBE ORDERS (OUTPATIENT)
Dept: LAB | Facility: HOSPITAL | Age: 48
End: 2023-12-19
Payer: COMMERCIAL

## 2023-12-19 DIAGNOSIS — E55.9 VITAMIN D DEFICIENCY: ICD-10-CM

## 2023-12-19 DIAGNOSIS — Z00.00 ROUTINE GENERAL MEDICAL EXAMINATION AT A HEALTH CARE FACILITY: ICD-10-CM

## 2023-12-19 DIAGNOSIS — E53.8 BIOTIN-(PROPIONYL-COA-CARBOXYLASE) LIGASE DEFICIENCY: ICD-10-CM

## 2023-12-19 DIAGNOSIS — Z00.00 ROUTINE GENERAL MEDICAL EXAMINATION AT A HEALTH CARE FACILITY: Primary | ICD-10-CM

## 2023-12-19 DIAGNOSIS — E03.8 HYPOTHYROIDISM, SECONDARY: ICD-10-CM

## 2023-12-19 LAB
25(OH)D3 SERPL-MCNC: 42 NG/ML (ref 30–100)
ALBUMIN SERPL-MCNC: 4.7 G/DL (ref 3.5–5.2)
ALBUMIN/GLOB SERPL: 1.7 G/DL
ALP SERPL-CCNC: 100 U/L (ref 39–117)
ALT SERPL W P-5'-P-CCNC: 13 U/L (ref 1–33)
ANION GAP SERPL CALCULATED.3IONS-SCNC: 12.1 MMOL/L (ref 5–15)
AST SERPL-CCNC: 12 U/L (ref 1–32)
BILIRUB SERPL-MCNC: <0.2 MG/DL (ref 0–1.2)
BUN SERPL-MCNC: 22 MG/DL (ref 6–20)
BUN/CREAT SERPL: 31.4 (ref 7–25)
CALCIUM SPEC-SCNC: 9.2 MG/DL (ref 8.6–10.5)
CHLORIDE SERPL-SCNC: 101 MMOL/L (ref 98–107)
CO2 SERPL-SCNC: 25.9 MMOL/L (ref 22–29)
CREAT SERPL-MCNC: 0.7 MG/DL (ref 0.57–1)
DEPRECATED RDW RBC AUTO: 40.1 FL (ref 37–54)
EGFRCR SERPLBLD CKD-EPI 2021: 106.8 ML/MIN/1.73
ERYTHROCYTE [DISTWIDTH] IN BLOOD BY AUTOMATED COUNT: 12.8 % (ref 12.3–15.4)
GLOBULIN UR ELPH-MCNC: 2.7 GM/DL
GLUCOSE SERPL-MCNC: 93 MG/DL (ref 65–99)
HCT VFR BLD AUTO: 42.1 % (ref 34–46.6)
HGB BLD-MCNC: 14.4 G/DL (ref 12–15.9)
MCH RBC QN AUTO: 29.9 PG (ref 26.6–33)
MCHC RBC AUTO-ENTMCNC: 34.2 G/DL (ref 31.5–35.7)
MCV RBC AUTO: 87.3 FL (ref 79–97)
PLATELET # BLD AUTO: 315 10*3/MM3 (ref 140–450)
PMV BLD AUTO: 9.2 FL (ref 6–12)
POTASSIUM SERPL-SCNC: 3.8 MMOL/L (ref 3.5–5.2)
PROT SERPL-MCNC: 7.4 G/DL (ref 6–8.5)
RBC # BLD AUTO: 4.82 10*6/MM3 (ref 3.77–5.28)
SODIUM SERPL-SCNC: 139 MMOL/L (ref 136–145)
T4 FREE SERPL-MCNC: 1 NG/DL (ref 0.93–1.7)
TSH SERPL DL<=0.05 MIU/L-ACNC: 0.75 UIU/ML (ref 0.27–4.2)
VIT B12 BLD-MCNC: 1572 PG/ML (ref 211–946)
WBC NRBC COR # BLD AUTO: 8.42 10*3/MM3 (ref 3.4–10.8)

## 2023-12-19 PROCEDURE — 80050 GENERAL HEALTH PANEL: CPT

## 2023-12-19 PROCEDURE — 82306 VITAMIN D 25 HYDROXY: CPT

## 2023-12-19 PROCEDURE — 84439 ASSAY OF FREE THYROXINE: CPT

## 2023-12-19 PROCEDURE — 36415 COLL VENOUS BLD VENIPUNCTURE: CPT

## 2023-12-19 PROCEDURE — 82607 VITAMIN B-12: CPT

## 2024-01-10 ENCOUNTER — HOSPITAL ENCOUNTER (OUTPATIENT)
Dept: MAMMOGRAPHY | Facility: HOSPITAL | Age: 49
Discharge: HOME | End: 2024-01-10
Admitting: FAMILY MEDICINE
Payer: COMMERCIAL

## 2024-01-10 DIAGNOSIS — Z12.31 VISIT FOR SCREENING MAMMOGRAM: ICD-10-CM

## 2024-01-10 PROCEDURE — 77067 SCR MAMMO BI INCL CAD: CPT

## 2024-01-10 PROCEDURE — 77063 BREAST TOMOSYNTHESIS BI: CPT

## 2024-03-13 ENCOUNTER — OUTSIDE FACILITY SERVICE (OUTPATIENT)
Dept: GASTROENTEROLOGY | Facility: CLINIC | Age: 49
End: 2024-03-13
Payer: COMMERCIAL

## 2024-03-13 PROCEDURE — 88305 TISSUE EXAM BY PATHOLOGIST: CPT | Performed by: INTERNAL MEDICINE

## 2024-03-13 PROCEDURE — 43239 EGD BIOPSY SINGLE/MULTIPLE: CPT | Performed by: INTERNAL MEDICINE

## 2024-03-14 ENCOUNTER — LAB REQUISITION (OUTPATIENT)
Dept: LAB | Facility: HOSPITAL | Age: 49
End: 2024-03-14
Payer: COMMERCIAL

## 2024-03-14 DIAGNOSIS — K29.70 GASTRITIS, UNSPECIFIED, WITHOUT BLEEDING: ICD-10-CM

## 2024-03-14 DIAGNOSIS — R13.10 DYSPHAGIA, UNSPECIFIED: ICD-10-CM

## 2024-03-14 DIAGNOSIS — K21.9 GASTRO-ESOPHAGEAL REFLUX DISEASE WITHOUT ESOPHAGITIS: ICD-10-CM

## 2024-03-15 ENCOUNTER — TELEPHONE (OUTPATIENT)
Dept: GASTROENTEROLOGY | Facility: CLINIC | Age: 49
End: 2024-03-15
Payer: COMMERCIAL

## 2024-03-15 NOTE — TELEPHONE ENCOUNTER
I called and spoke with Ms. Lama today.  I stated there was no evidence for Isbell's esophagus.  She had mild changes of reflux in the distal esophagus.  The main finding was food residue in the stomach.  She did state that the night before she ate fairly late.  I stated the option would be to perform a 4-hour gastric emptying study or clinically follow-up at this time.  She would like to follow-up and see how she does going forward at this time.  I stated she can call the office to have the gastric emptying study performed if she starts to develop any early morning nausea or  issues with with bloating.

## 2024-03-20 ENCOUNTER — LAB (OUTPATIENT)
Dept: LAB | Facility: HOSPITAL | Age: 49
End: 2024-03-20
Payer: COMMERCIAL

## 2024-03-20 ENCOUNTER — TRANSCRIBE ORDERS (OUTPATIENT)
Dept: LAB | Facility: HOSPITAL | Age: 49
End: 2024-03-20
Payer: COMMERCIAL

## 2024-03-20 DIAGNOSIS — E03.8 TOXIC DIFFUSE GOITER WITH PRETIBIAL MYXEDEMA: Primary | ICD-10-CM

## 2024-03-20 DIAGNOSIS — E03.8 TOXIC DIFFUSE GOITER WITH PRETIBIAL MYXEDEMA: ICD-10-CM

## 2024-03-20 DIAGNOSIS — E05.00 TOXIC DIFFUSE GOITER WITH PRETIBIAL MYXEDEMA: Primary | ICD-10-CM

## 2024-03-20 DIAGNOSIS — E05.00 TOXIC DIFFUSE GOITER WITH PRETIBIAL MYXEDEMA: ICD-10-CM

## 2024-03-20 LAB
T4 FREE SERPL-MCNC: 1.07 NG/DL (ref 0.93–1.7)
TSH SERPL DL<=0.05 MIU/L-ACNC: 0.77 UIU/ML (ref 0.27–4.2)

## 2024-03-20 PROCEDURE — 84439 ASSAY OF FREE THYROXINE: CPT

## 2024-03-20 PROCEDURE — 84443 ASSAY THYROID STIM HORMONE: CPT

## 2024-03-20 PROCEDURE — 36415 COLL VENOUS BLD VENIPUNCTURE: CPT

## 2024-06-29 ENCOUNTER — LAB (OUTPATIENT)
Dept: LAB | Facility: HOSPITAL | Age: 49
End: 2024-06-29
Payer: COMMERCIAL

## 2024-06-29 ENCOUNTER — TRANSCRIBE ORDERS (OUTPATIENT)
Dept: LAB | Facility: HOSPITAL | Age: 49
End: 2024-06-29
Payer: COMMERCIAL

## 2024-06-29 DIAGNOSIS — R73.9 BLOOD GLUCOSE ELEVATED: ICD-10-CM

## 2024-06-29 DIAGNOSIS — R53.83 TIREDNESS: ICD-10-CM

## 2024-06-29 DIAGNOSIS — Z00.00 ROUTINE GENERAL MEDICAL EXAMINATION AT A HEALTH CARE FACILITY: Primary | ICD-10-CM

## 2024-06-29 DIAGNOSIS — Z00.00 ROUTINE GENERAL MEDICAL EXAMINATION AT A HEALTH CARE FACILITY: ICD-10-CM

## 2024-06-29 DIAGNOSIS — E55.9 AVITAMINOSIS D: ICD-10-CM

## 2024-06-29 DIAGNOSIS — E03.8 SECONDARY HYPOTHYROIDISM: ICD-10-CM

## 2024-06-29 LAB
25(OH)D3 SERPL-MCNC: 39.6 NG/ML (ref 30–100)
ALBUMIN SERPL-MCNC: 4.6 G/DL (ref 3.5–5.2)
ALBUMIN/GLOB SERPL: 1.7 G/DL
ALP SERPL-CCNC: 99 U/L (ref 39–117)
ALT SERPL W P-5'-P-CCNC: 18 U/L (ref 1–33)
ANION GAP SERPL CALCULATED.3IONS-SCNC: 10 MMOL/L (ref 5–15)
AST SERPL-CCNC: 17 U/L (ref 1–32)
BILIRUB SERPL-MCNC: <0.2 MG/DL (ref 0–1.2)
BUN SERPL-MCNC: 22 MG/DL (ref 6–20)
BUN/CREAT SERPL: 28.9 (ref 7–25)
CALCIUM SPEC-SCNC: 9.6 MG/DL (ref 8.6–10.5)
CHLORIDE SERPL-SCNC: 102 MMOL/L (ref 98–107)
CHOLEST SERPL-MCNC: 226 MG/DL (ref 0–200)
CO2 SERPL-SCNC: 27 MMOL/L (ref 22–29)
CREAT SERPL-MCNC: 0.76 MG/DL (ref 0.57–1)
DEPRECATED RDW RBC AUTO: 40.1 FL (ref 37–54)
EGFRCR SERPLBLD CKD-EPI 2021: 96.2 ML/MIN/1.73
ERYTHROCYTE [DISTWIDTH] IN BLOOD BY AUTOMATED COUNT: 12.3 % (ref 12.3–15.4)
GLOBULIN UR ELPH-MCNC: 2.7 GM/DL
GLUCOSE SERPL-MCNC: 90 MG/DL (ref 65–99)
HBA1C MFR BLD: 5.1 % (ref 4.8–5.6)
HCT VFR BLD AUTO: 44.1 % (ref 34–46.6)
HDLC SERPL-MCNC: 97 MG/DL (ref 40–60)
HGB BLD-MCNC: 14.3 G/DL (ref 12–15.9)
IRON 24H UR-MRATE: 90 MCG/DL (ref 37–145)
IRON SATN MFR SERPL: 22 % (ref 20–50)
LDLC SERPL CALC-MCNC: 113 MG/DL (ref 0–100)
LDLC/HDLC SERPL: 1.14 {RATIO}
MCH RBC QN AUTO: 28.7 PG (ref 26.6–33)
MCHC RBC AUTO-ENTMCNC: 32.4 G/DL (ref 31.5–35.7)
MCV RBC AUTO: 88.6 FL (ref 79–97)
PLATELET # BLD AUTO: 298 10*3/MM3 (ref 140–450)
PMV BLD AUTO: 9 FL (ref 6–12)
POTASSIUM SERPL-SCNC: 4.2 MMOL/L (ref 3.5–5.2)
PROT SERPL-MCNC: 7.3 G/DL (ref 6–8.5)
RBC # BLD AUTO: 4.98 10*6/MM3 (ref 3.77–5.28)
SODIUM SERPL-SCNC: 139 MMOL/L (ref 136–145)
T4 FREE SERPL-MCNC: 1.06 NG/DL (ref 0.92–1.68)
TIBC SERPL-MCNC: 413 MCG/DL (ref 298–536)
TRANSFERRIN SERPL-MCNC: 277 MG/DL (ref 200–360)
TRIGL SERPL-MCNC: 93 MG/DL (ref 0–150)
TSH SERPL DL<=0.05 MIU/L-ACNC: 0.76 UIU/ML (ref 0.27–4.2)
VIT B12 BLD-MCNC: 1143 PG/ML (ref 211–946)
VLDLC SERPL-MCNC: 16 MG/DL (ref 5–40)
WBC NRBC COR # BLD AUTO: 7.57 10*3/MM3 (ref 3.4–10.8)

## 2024-06-29 PROCEDURE — 82607 VITAMIN B-12: CPT

## 2024-06-29 PROCEDURE — 84466 ASSAY OF TRANSFERRIN: CPT

## 2024-06-29 PROCEDURE — 84439 ASSAY OF FREE THYROXINE: CPT

## 2024-06-29 PROCEDURE — 83540 ASSAY OF IRON: CPT

## 2024-06-29 PROCEDURE — 80061 LIPID PANEL: CPT

## 2024-06-29 PROCEDURE — 80050 GENERAL HEALTH PANEL: CPT

## 2024-06-29 PROCEDURE — 36415 COLL VENOUS BLD VENIPUNCTURE: CPT

## 2024-06-29 PROCEDURE — 82306 VITAMIN D 25 HYDROXY: CPT

## 2024-06-29 PROCEDURE — 83036 HEMOGLOBIN GLYCOSYLATED A1C: CPT

## 2024-10-08 ENCOUNTER — TRANSCRIBE ORDERS (OUTPATIENT)
Dept: ADMINISTRATIVE | Facility: HOSPITAL | Age: 49
End: 2024-10-08
Payer: COMMERCIAL

## 2024-10-08 DIAGNOSIS — Z12.31 SCREENING MAMMOGRAM FOR BREAST CANCER: Primary | ICD-10-CM

## 2025-01-02 ENCOUNTER — SPECIALTY PHARMACY (OUTPATIENT)
Dept: PHARMACY | Facility: TELEHEALTH | Age: 50
End: 2025-01-02
Payer: COMMERCIAL

## 2025-01-02 PROBLEM — G43.009 MIGRAINE WITHOUT AURA AND WITHOUT STATUS MIGRAINOSUS, NOT INTRACTABLE: Status: ACTIVE | Noted: 2025-01-02

## 2025-01-02 NOTE — PROGRESS NOTES
Specialty Pharmacy Patient Management Program  Initial Assessment     Honey Lama is a 49 y.o. female with chronic migraine and enrolled in the Patient Management program offered by [facility]. An initial outreach was conducted, including assessment of therapy appropriateness and specialty medication education for Emgality 120mg/ml auto injector. The patient was introduced to services offered by [facility], including: regular assessments, refill coordination, curbside pick-up or mail order delivery options, prior authorization maintenance, and financial assistance programs as applicable. The patient was also provided with contact information for the pharmacy team.     Insurance Coverage & Financial Support  Affirmed rx and  co-pay card      Relevant Past Medical History and Comorbidities  Relevant medical history and concomitant health conditions were discussed with the patient. The patient's chart has been reviewed for relevant past medical history and comorbid health conditions and updated as necessary.   Past Medical History:   Diagnosis Date    Allergic rhinitis     Anxiety and depression     Arthritis     Asthma     Body piercing     BILATERAL EARS    Bursitis of left hip     h/o    Diarrhea     Fibromyalgia     GERD (gastroesophageal reflux disease)     Hypercupremia     Hypertension     IBS (irritable bowel syndrome)     Migraine     Nausea with vomiting     PCOS (polycystic ovarian syndrome)     PONV (postoperative nausea and vomiting)     Sleep apnea     Sore throat     Strep pharyngitis     Vitamin D deficiency      Social History     Socioeconomic History    Marital status:    Tobacco Use    Smoking status: Never    Smokeless tobacco: Never   Substance and Sexual Activity    Alcohol use: Yes     Comment: ocassionally    Drug use: No     Comment: naproxen    Sexual activity: Defer     Problem list reviewed by Lidya Martin, BettyD  on 1/2/2025 at 12:47 PM    Allergies  Known allergies and reactions were discussed with the patient. The patient's chart has been reviewed for allergy information and updated as necessary.   Losartan, Metoprolol, Minocycline, Nifedipine, Rifampin, Sulfamethoxazole-trimethoprim, Sulfamethoxazole, and Trimethoprim  Allergies reviewed by Lidya Martin, Paola on 1/2/2025 at 12:45 PM    Current Medication List  This medication list has been reviewed with the patient and evaluated for any interactions or necessary modifications/recommendations, and updated to include all prescription medications, OTC medications, and supplements the patient is currently taking. This list reflects what is contained in the patient's profile, which has also been marked as reviewed to communicate to other providers it is the most up to date version of the patient's current medication therapy.     Current Outpatient Medications:     albuterol sulfate  (90 Base) MCG/ACT inhaler, Inhale 2 puffs by mouth every 6-8 hours as needed., Disp: 18 g, Rfl: 3    amLODIPine (NORVASC) 10 MG tablet, Take 1 tablet by mouth Daily., Disp: 30 tablet, Rfl: 5    amLODIPine (NORVASC) 5 MG tablet, Take 1-2 tablet by mouth every day (Patient not taking: Reported on 5/10/2023), Disp: 60 tablet, Rfl: 6    amphetamine-dextroamphetamine (Adderall) 15 MG tablet, Take 1 tablet by mouth in the morning and 1 tablet in the afternoon, Disp: 60 tablet, Rfl: 0    ARIPiprazole (Abilify) 5 MG tablet, Take 1 tablet by mouth Daily., Disp: 30 tablet, Rfl: 2    ARIPiprazole (Abilify) 5 MG tablet, Take 1 tablet by mouth Daily., Disp: 30 tablet, Rfl: 2    carvedilol (COREG) 3.125 MG tablet, Take 1 tablet by mouth 2 (Two) Times a Day., Disp: 60 tablet, Rfl: 5    cefdinir (OMNICEF) 300 MG capsule, Take 1 capsule by mouth 2 (Two) Times a Day., Disp: 20 capsule, Rfl: 0    Cholecalciferol (VITAMIN D3) 2000 UNITS tablet, Take 5,000 Units by mouth 3 (Three) Times a Day., Disp: , Rfl:      DULoxetine (CYMBALTA) 60 MG capsule, Take 1 capsule by mouth Daily., Disp: 30 capsule, Rfl: 5    esomeprazole (nexIUM) 40 MG capsule, Take 1 capsule by mouth Daily., Disp: 30 capsule, Rfl: 5    esomeprazole (nexIUM) 40 MG capsule, Take 1 capsule by mouth Daily., Disp: 30 capsule, Rfl: 5    esomeprazole (nexIUM) 40 MG capsule, Take 1 capsule by mouth Daily., Disp: 30 capsule, Rfl: 5    fluconazole (Diflucan) 150 MG tablet, Take 1 tablet by mouth once and repeat in 1 week., Disp: 2 tablet, Rfl: 0    galcanezumab-gnlm (Emgality) 120 MG/ML auto-injector pen, Inject contents of 1 syringe (1mL) under the skin once a month, Disp: 1 mL, Rfl: 5    galcanezumab-gnlm (Emgality) 120 MG/ML auto-injector pen, Inject contents of 1 syringe (1mL) under the skin once a month, Disp: 1 mL, Rfl: 5    levothyroxine (SYNTHROID, LEVOTHROID) 25 MCG tablet, Take 1 tablet by mouth Daily. (Patient not taking: Reported on 5/10/2023), Disp: 30 tablet, Rfl: 1    levothyroxine (SYNTHROID, LEVOTHROID) 50 MCG tablet, Take 1 tablet by mouth Daily., Disp: 30 tablet, Rfl: 2    lisdexamfetamine (Vyvanse) 50 MG capsule, Take 1 capsule by mouth Daily, Disp: 30 capsule, Rfl: 0    meloxicam (MOBIC) 15 MG tablet, Take 1 tablet by mouth daily., Disp: 30 tablet, Rfl: 5    metFORMIN ER (GLUCOPHAGE-XR) 750 MG 24 hr tablet, Take 1 tablet by mouth Daily., Disp: 30 tablet, Rfl: 5    methylphenidate (RITALIN) 20 MG tablet, Take 1 tablet by mouth Every Morning., Disp: , Rfl:     montelukast (SINGULAIR) 10 MG tablet, Take 1 tablet by mouth Daily., Disp: 90 tablet, Rfl: 3    OXcarbazepine (TRILEPTAL) 300 MG tablet, Take 1 tablet by mouth daily in the morning and 2 tablets by mouth in the evening, Disp: 90 tablet, Rfl: 2    OXcarbazepine (TRILEPTAL) 300 MG tablet, Take 1 tablet by mouth daily in the morning and 2 tablets by mouth in the evening, Disp: 90 tablet, Rfl: 2    OXcarbazepine (TRILEPTAL) 300 MG tablet, Take 1 tablet by mouth daily in the morning and 2  tablets by mouth in the evening, Disp: 90 tablet, Rfl: 2    OXcarbazepine (TRILEPTAL) 300 MG tablet, Take 1 tablet by mouth Every Morning AND 2 tablets Every Evening., Disp: 90 tablet, Rfl: 2    OXcarbazepine (TRILEPTAL) 300 MG tablet, Take 1 tablet by mouth Every Morning, THEN 2 tablets Every Evening, Disp: 90 tablet, Rfl: 2    OXcarbazepine (TRILEPTAL) 300 MG tablet, Take 1 tablet by mouth Every Morning, THEN 2 tablets Every Evening, Disp: 90 tablet, Rfl: 0    pregabalin (Lyrica) 100 MG capsule, Take 1 capsule by mouth 2 (Two) Times a Day., Disp: 60 capsule, Rfl: 2    Semaglutide-Weight Management (Wegovy) 0.5 MG/0.5ML solution auto-injector, Inject 0.5 mL under the skin into the appropriate area as directed 1 (One) Time Per Week., Disp: 18 mL, Rfl: 0    spironolactone (ALDACTONE) 25 MG tablet, Take 2 tablets by mouth daily., Disp: 60 tablet, Rfl: 5    spironolactone (ALDACTONE) 25 MG tablet, Take 2 tablets by mouth Daily., Disp: 60 tablet, Rfl: 5    temazepam (RESTORIL) 15 MG capsule, Take 1-2 capsules by mouth every night at bedtime as needed for sleep (Patient not taking: Reported on 5/10/2023), Disp: 60 capsule, Rfl: 2    temazepam (RESTORIL) 30 MG capsule, Take 1 capsule by mouth Every Night. Do not take with Lunesta, Disp: 30 capsule, Rfl: 4    temazepam (RESTORIL) 30 MG capsule, Take 1 capsule by mouth Every Night. Do not take with Lunesta, Disp: 30 capsule, Rfl: 4    tiZANidine (ZANAFLEX) 4 MG tablet, Take 1 tablet by mouth 3 (Three) Times a Day As Needed., Disp: 90 tablet, Rfl: 1    tiZANidine (ZANAFLEX) 4 MG tablet, Take 1 tablet by mouth 3 (Three) Times a Day As Needed., Disp: 90 tablet, Rfl: 1  Medicines reviewed by Lidya Martin, PharmD on 1/2/2025 at 12:46 PM    Drug Interactions  none     Relevant Laboratory Values  Lab Results   Component Value Date    GLUCOSE 90 06/29/2024    CALCIUM 9.6 06/29/2024     06/29/2024    K 4.2 06/29/2024    CO2 27.0 06/29/2024     06/29/2024    BUN 22 (H)  06/29/2024    CREATININE 0.76 06/29/2024    BCR 28.9 (H) 06/29/2024    ANIONGAP 10.0 06/29/2024     Lab Results   Component Value Date    WBC 7.57 06/29/2024    HGB 14.3 06/29/2024    HCT 44.1 06/29/2024    MCV 88.6 06/29/2024     06/29/2024     Lab Value Review  The above lab values have been reviewed; the following specialty medication(s) dose adjustment(s) are recommended: none.    Initial Education Provided for Specialty Medication  The patient has been provided with the following education and any applicable administration techniques (i.e. self-injection) have been demonstrated for the therapies indicated. All questions and concerns have been addressed prior to the patient receiving the medication, and the patient has verbalized understanding of the education and any materials provided. Additional patient education shall be provided and documented upon request by the patient, provider or payer.              Emgality (Galcanezumab-gnlm)        Medication Expectations   Why am I taking this medication? You are taking this medication for migraine prophylaxis.   What should I expect while on this medication? You should expect to a decrease in the frequency and severity of your migraines.   How does the medication work? Emgality is a monoclonal antibody that binds to calcitonin gene-related peptide (CGRP) and blocks its binding to the receptor decreasing the severity of migraines.   How long will I be on this medication for? The amount of time you will be on this medication will be determined by your doctor and your response to the medication.    How do I take this medication? Take as directed on your prescription label. This medication is a self-injection given every 28 days.    What are some possible side effects? Injection site reactions and hypersensitivity reactions.   What happens if I miss a dose? If you miss a dose, take it as soon as you remember, and time next dose 28 days from last dose.                   Medication Safety   What are things I should warn my doctor immediately about? Hypersensitivity reactions.   What are things that I should be cautious of? Injection site reaction.   What are some medications that can interact with this one? No drug interactions identified.            Medication Storage/Handling   How should I handle this medication? Keep this medication our of reach of pets/children in original container.  On the day your Emgality is due let it set at room temperature for 30 minutes prior to injection. (do NOT warm using a heat source such as hot water or a microwave).  Administer in the abdomen, thigh, back of the upper arm, or buttocks.  Do not inject where the skin is tender, bruised, red or hard.  Rotate injection sites.   How does this medication need to be stored? Store in refrigerator and keep dry.   How should I dispose of this medication? You can dispose of the empty syringe in a sharps container, and if this is not available you may use an empty hard plastic container such as a milk jug or tide container.            Resources/Support   How can I remind myself to take this medication? You can download a reminder trena on your phone or use a calandar  to help with your monthly injection.   Is financial support available?  Yes, [employer] can provide co-pay cards if you have commercial insurance or patient assistance if you have Medicare or no insurance.    Which vaccines are recommended for me? Talk to your doctor about these vaccines: Flu, Coronavirus (COVID-19), Pneumococcal (pneumonia), Tdap, Hepatitis B, Zoster (shingles)                 Adherence, Self-Administration, and Current Therapy Problems  Adherence related to the patient's specialty therapy was discussed with the patient. The Adherence segment of this outreach has been reviewed and updated.          Additional Barriers to Patient Self-Administration: None  Methods for Supporting Patient Self-Administration: N/A    Open  Medication Therapy Problems  No medication therapy recommendations to display    Goals of Therapy   Goals Addressed Today        Specialty Pharmacy General Goal      Decrease frequency, severity and duration of migraine attacks.               Reassessment Plan & Follow-Up  Medication Therapy Changes: First fill managed by call center. Patient has been taking Emgality for prevention.  Additional Plans, Therapy Recommendations, or Therapy Problems to Be Addressed: none   Pharmacist to perform regular reassessments no more than (6) months from the previous assessment.  Welcome information and patient satisfaction survey to be sent by retail team with patient's initial fill.  Care Coordinator to set up future refill outreaches, coordinate prescription delivery, and escalate clinical questions to pharmacist.     Attestation  I attest the patient was actively involved in and has agreed to the above plan of care. I attest that the initiated specialty medication(s) are appropriate for the patient based on my assessment. If the prescribed therapy is at any point deemed not appropriate based on the current or future assessments, a consultation will be initiated with the patient's specialty care provider to determine the best course of action. The revised plan of therapy will be documented along with any reassessments and/or additional patient education provided.     Electronically signed by Lidya Martin PharmD, 01/02/25, 12:47 PM EST.

## 2025-01-02 NOTE — PROGRESS NOTES
Specialty Pharmacy Refill Coordination Note     Honey is a 49 y.o. female contacted today regarding refills of  *** specialty medication(s).    Reviewed and verified with patient:       Specialty medication(s) and dose(s) confirmed: {YES NO YES DEFAULT:08972}                   Follow-up: *** {DAYS, WEEKS, MONTHS, YEARS:93801}     Samreen Garcia, Pharmacy Technician  Specialty Pharmacy Technician

## 2025-01-28 ENCOUNTER — LAB (OUTPATIENT)
Dept: LAB | Facility: HOSPITAL | Age: 50
End: 2025-01-28
Payer: COMMERCIAL

## 2025-01-28 ENCOUNTER — TRANSCRIBE ORDERS (OUTPATIENT)
Dept: LAB | Facility: HOSPITAL | Age: 50
End: 2025-01-28
Payer: COMMERCIAL

## 2025-01-28 DIAGNOSIS — E55.9 VITAMIN D DEFICIENCY, UNSPECIFIED: ICD-10-CM

## 2025-01-28 DIAGNOSIS — Z00.00 ROUTINE GENERAL MEDICAL EXAMINATION AT A HEALTH CARE FACILITY: Primary | ICD-10-CM

## 2025-01-28 DIAGNOSIS — R73.9 BLOOD GLUCOSE ELEVATED: ICD-10-CM

## 2025-01-28 DIAGNOSIS — Z00.00 ROUTINE GENERAL MEDICAL EXAMINATION AT A HEALTH CARE FACILITY: ICD-10-CM

## 2025-01-28 DIAGNOSIS — E03.8 SECONDARY HYPOTHYROIDISM: ICD-10-CM

## 2025-01-28 LAB
25(OH)D3 SERPL-MCNC: 32.1 NG/ML (ref 30–100)
ALBUMIN SERPL-MCNC: 4.2 G/DL (ref 3.5–5.2)
ALBUMIN/GLOB SERPL: 1.4 G/DL
ALP SERPL-CCNC: 108 U/L (ref 39–117)
ALT SERPL W P-5'-P-CCNC: 19 U/L (ref 1–33)
ANION GAP SERPL CALCULATED.3IONS-SCNC: 9.5 MMOL/L (ref 5–15)
AST SERPL-CCNC: 17 U/L (ref 1–32)
BILIRUB SERPL-MCNC: 0.2 MG/DL (ref 0–1.2)
BUN SERPL-MCNC: 17 MG/DL (ref 6–20)
BUN/CREAT SERPL: 23.3 (ref 7–25)
CALCIUM SPEC-SCNC: 9.1 MG/DL (ref 8.6–10.5)
CHLORIDE SERPL-SCNC: 100 MMOL/L (ref 98–107)
CO2 SERPL-SCNC: 26.5 MMOL/L (ref 22–29)
CREAT SERPL-MCNC: 0.73 MG/DL (ref 0.57–1)
DEPRECATED RDW RBC AUTO: 37.9 FL (ref 37–54)
EGFRCR SERPLBLD CKD-EPI 2021: 100.3 ML/MIN/1.73
ERYTHROCYTE [DISTWIDTH] IN BLOOD BY AUTOMATED COUNT: 12.2 % (ref 12.3–15.4)
GLOBULIN UR ELPH-MCNC: 3.1 GM/DL
GLUCOSE SERPL-MCNC: 75 MG/DL (ref 65–99)
HBA1C MFR BLD: 5.4 % (ref 4.8–5.6)
HCT VFR BLD AUTO: 43 % (ref 34–46.6)
HGB BLD-MCNC: 14.5 G/DL (ref 12–15.9)
MCH RBC QN AUTO: 28.8 PG (ref 26.6–33)
MCHC RBC AUTO-ENTMCNC: 33.7 G/DL (ref 31.5–35.7)
MCV RBC AUTO: 85.5 FL (ref 79–97)
PLATELET # BLD AUTO: 332 10*3/MM3 (ref 140–450)
PMV BLD AUTO: 9.3 FL (ref 6–12)
POTASSIUM SERPL-SCNC: 3.8 MMOL/L (ref 3.5–5.2)
PROT SERPL-MCNC: 7.3 G/DL (ref 6–8.5)
RBC # BLD AUTO: 5.03 10*6/MM3 (ref 3.77–5.28)
SODIUM SERPL-SCNC: 136 MMOL/L (ref 136–145)
T4 FREE SERPL-MCNC: 1.02 NG/DL (ref 0.92–1.68)
TSH SERPL DL<=0.05 MIU/L-ACNC: 0.9 UIU/ML (ref 0.27–4.2)
WBC NRBC COR # BLD AUTO: 6.5 10*3/MM3 (ref 3.4–10.8)

## 2025-01-28 PROCEDURE — 83036 HEMOGLOBIN GLYCOSYLATED A1C: CPT

## 2025-01-28 PROCEDURE — 36415 COLL VENOUS BLD VENIPUNCTURE: CPT

## 2025-01-28 PROCEDURE — 80050 GENERAL HEALTH PANEL: CPT

## 2025-01-28 PROCEDURE — 82306 VITAMIN D 25 HYDROXY: CPT

## 2025-01-28 PROCEDURE — 84439 ASSAY OF FREE THYROXINE: CPT

## 2025-01-29 ENCOUNTER — HOSPITAL ENCOUNTER (OUTPATIENT)
Dept: MAMMOGRAPHY | Facility: HOSPITAL | Age: 50
Discharge: HOME | End: 2025-01-29
Admitting: FAMILY MEDICINE
Payer: COMMERCIAL

## 2025-01-29 DIAGNOSIS — Z12.31 SCREENING MAMMOGRAM FOR BREAST CANCER: ICD-10-CM

## 2025-01-29 PROCEDURE — 77067 SCR MAMMO BI INCL CAD: CPT

## 2025-01-29 PROCEDURE — 77063 BREAST TOMOSYNTHESIS BI: CPT

## 2025-02-12 ENCOUNTER — SPECIALTY PHARMACY (OUTPATIENT)
Dept: PHARMACY | Facility: TELEHEALTH | Age: 50
End: 2025-02-12
Payer: COMMERCIAL

## 2025-02-12 NOTE — PROGRESS NOTES
Specialty Pharmacy Patient Management Program  Refill Outreach     Honey was contacted today regarding refills of their medication(s).    Refill Questions      Flowsheet Row Most Recent Value   Changes to allergies? No   Changes to medications? No   New conditions or infections since last clinic visit No   Unplanned office visit, urgent care, ED, or hospital admission in the last 4 weeks  No   How does patient/caregiver feel medication is working? Very good   Financial problems or insurance changes  No   Since the previous refill, were any specialty medication doses or scheduled injections missed or delayed?  No   Does this patient require a clinical escalation to a pharmacist? No            Delivery Questions      Flowsheet Row Most Recent Value   Delivery method UPS  [UPS NEXT DAY AIR]   Delivery address verified with patient/caregiver? Yes  [UPS NEXT DAY AIR]   Delivery address Home  [UPS NEXT DAY AIR]   Number of medications in delivery 1   Medication(s) being filled and delivered Galcanezumab-gnlm (Emgality)   Doses left of specialty medications 1   Copay verified? Yes  [$35.00]   Copay amount $35.00   Copay form of payment Credit/debit on file  [VISA ending in 4046 $35.00]   Ship Date 2/12/25   Delivery Date Selection 02/13/25   Signature Required No                 Follow-up: 21 day(s)     Samreen Garcia, Pharmacy Technician  2/12/2025  10:09 EST

## 2025-03-18 ENCOUNTER — SPECIALTY PHARMACY (OUTPATIENT)
Dept: PHARMACY | Facility: TELEHEALTH | Age: 50
End: 2025-03-18
Payer: COMMERCIAL

## 2025-03-18 NOTE — PROGRESS NOTES
Specialty Pharmacy Patient Management Program  Refill Outreach     Honey was contacted today regarding refills of their medication(s).    Refill Questions      Flowsheet Row Most Recent Value   Changes to allergies? No   Changes to medications? No   New conditions or infections since last clinic visit No   Unplanned office visit, urgent care, ED, or hospital admission in the last 4 weeks  No   How does patient/caregiver feel medication is working? Very good   Financial problems or insurance changes  No   Since the previous refill, were any specialty medication doses or scheduled injections missed or delayed?  No   Does this patient require a clinical escalation to a pharmacist? No            Delivery Questions      Flowsheet Row Most Recent Value   Delivery method UPS  [UPS NEXT DAY AIR]   Delivery address verified with patient/caregiver? Yes  [UPS NEXT DAY AIR]   Delivery address Home  [UPS NEXT DAY AIR]   Other address preferred N/A   Number of medications in delivery 1   Medication(s) being filled and delivered Galcanezumab-gnlm (Emgality)   Doses left of specialty medications 0   Copay verified? Yes  [$35.00]   Copay amount $35.00   Copay form of payment Credit/debit on file  [credit card Visa ending in 0699 $35.00]   Delivery Date Selection 03/19/25   Signature Required No                 Follow-up: 84 day(s)     Samreen Garcia, Pharmacy Technician  3/18/2025  09:29 EDT

## 2025-04-03 ENCOUNTER — OFFICE VISIT (OUTPATIENT)
Dept: BEHAVIORAL HEALTH | Facility: CLINIC | Age: 50
End: 2025-04-03
Payer: COMMERCIAL

## 2025-04-03 VITALS
HEIGHT: 61 IN | WEIGHT: 150.1 LBS | BODY MASS INDEX: 28.34 KG/M2 | SYSTOLIC BLOOD PRESSURE: 118 MMHG | DIASTOLIC BLOOD PRESSURE: 78 MMHG

## 2025-04-03 DIAGNOSIS — F90.2 ADHD (ATTENTION DEFICIT HYPERACTIVITY DISORDER), COMBINED TYPE: ICD-10-CM

## 2025-04-03 DIAGNOSIS — F31.81 BIPOLAR 2 DISORDER: Primary | ICD-10-CM

## 2025-04-03 DIAGNOSIS — F51.04 PSYCHOPHYSIOLOGICAL INSOMNIA: ICD-10-CM

## 2025-04-03 DIAGNOSIS — F41.1 GAD (GENERALIZED ANXIETY DISORDER): ICD-10-CM

## 2025-04-03 RX ORDER — MULTIVIT WITH MINERALS/LUTEIN
400 TABLET ORAL DAILY
COMMUNITY

## 2025-04-03 RX ORDER — DULOXETIN HYDROCHLORIDE 60 MG/1
60 CAPSULE, DELAYED RELEASE ORAL DAILY
Qty: 90 CAPSULE | Refills: 1 | Status: SHIPPED | OUTPATIENT
Start: 2025-04-03

## 2025-04-03 RX ORDER — TEMAZEPAM 30 MG/1
30 CAPSULE ORAL NIGHTLY PRN
Qty: 30 CAPSULE | Refills: 2 | Status: SHIPPED | OUTPATIENT
Start: 2025-04-21

## 2025-04-03 RX ORDER — OXCARBAZEPINE 300 MG/1
TABLET, FILM COATED ORAL
Qty: 90 TABLET | Refills: 2 | Status: SHIPPED | OUTPATIENT
Start: 2025-04-03

## 2025-04-03 RX ORDER — DEXTROAMPHETAMINE SACCHARATE, AMPHETAMINE ASPARTATE, DEXTROAMPHETAMINE SULFATE AND AMPHETAMINE SULFATE 3.75; 3.75; 3.75; 3.75 MG/1; MG/1; MG/1; MG/1
TABLET ORAL
Qty: 60 TABLET | Refills: 0 | Status: SHIPPED | OUTPATIENT
Start: 2025-04-25

## 2025-04-03 RX ORDER — ARIPIPRAZOLE 5 MG/1
5 TABLET ORAL DAILY
Qty: 90 TABLET | Refills: 1 | Status: SHIPPED | OUTPATIENT
Start: 2025-04-03

## 2025-04-03 RX ORDER — LANOLIN ALCOHOL/MO/W.PET/CERES
1000 CREAM (GRAM) TOPICAL DAILY
COMMUNITY

## 2025-04-03 NOTE — PATIENT INSTRUCTIONS
[email or web address]: online directory of therapists    Ce recommendations:  Laurie and Hoopla: free library access, including audiobooks and e-books  I Am: affirmations  Balance: mindfulness and meditation  Michaels: mental health ce for kids and adults (user sets goals/tasks)  Julius: ADHD/mental health ce for parents and kids (parents set goals/tasks)  Mood Bloom: facilitated thought progression, helps with depression    Bilateral stimulation music: free on youtube and spotify    Book Recommendations:  How To Do The Work, Rona Khan (follow the Holistic Psychologist)  Unf**k Your Brain, Sandra Linares  The Bipolar Disorder Survival Guide, Lily  Self Compassion, Aileen Martinez*  Rushing Woman's Syndrome, Laurie Calixto  The Emotionally Exhausted Woman, Carmen Lovelace  ADHD 2.0, by Ute  ADHD for Smart A** Women, Zeinab Chang

## 2025-04-07 NOTE — PROGRESS NOTES
New Patient Office Visit      Date: 2025  Patient Name: Honey Lama  : 1975   MRN: [id number]     Referring Provider: Annamaria Koch MD    Chief Complaint:  Honey Lama is a 50 y.o. female who is here today to establish care.     History of Present Illness: Patient is here today to transfer care.  She works in [employer] and thought it would be more convenient for her to change providers since her previous one is in [place].  Patient has been receiving psychiatric care for about 15 years, diagnosed with and treated for of Bipolar II and ADHD.  Patient does endorse having past episodes of hypomania although it has been about 10-15 years since she had and episode.  She experienced symptoms of not sleeping and not feeling like she needed it, increased energy, impulsive behavior, hypersexual and poor decisions during the course of several days.  Then she would become depressed and not want to get out of bed.  This had been a cycle since her early 20's.  She also endorses having symptoms of ADHD in the past.  These included lack of focus, difficulty concentrating, forgetfulness, restlessness, unable to finish a project and task paralysis.  She was diagnosed and started on Adderall several years ago.  Patient also has a history of anxiety.  The symptoms she has had she describes as restlessness, excessive worry, irritability, and feelings of impending doom.   Patient does have a history of having issues with sleep.  She had difficulty falling asleep and staying asleep and would only get a few hours of sleep a night.  She was started on Restoril and this has helped her.  She currently is able to fall asleep and sleep through the night without any issues.  Patient denies issues with her appetite and has not had any changes recently  Patient having any psychotic episodes, is currently not bothered by past trauma, and denies SI/HI or self harm.  Patient cannot recall medications she has had  in the past but is currently on Adderall 15mg twice a day and that is effective in managing her symptoms of ADHD.  She is also taking Cymbalta 60mg, Abilify 5mg, Trileptal twice a day 300mg am and 600mg pm.  She feels that this combination has been helpful for her to manage her symptoms of bipolar II.   Patient feels symptoms are well managed and is not having any major impacts on her daily life due to symptoms  Patient does have a history of chronic pain and can contribute to her mood dysregulation at times.   Her current stressors include caring for her elderly father who has Parkinson's, her  not able to work due to a disability, work stress and financial stress  She does enjoys going to concerts and reading, these things helps her deal with her daily stress.  Here goal of care to day is to transfer to this provider and to continue medication combination.    Subjective      Review of Systems:   Review of Systems   Psychiatric/Behavioral:  The patient is nervous/anxious.        Screening Scores:   PHQ-9 : 7  DELILAH-7 : 3  PTSD: 20  MDQ: 6  ASRS-V1.1: Negative (medicated)    Social History:  Where was patient born: [place], [place]  Where does patient currently live: [place], [place]  Describe living situation: Lives with  (good relationship) son 26yo  Pets: 2 dogs and a cat  Highest level of education obtained: Associates degree  Patient's occupation: MA at [employer]  Leisure and recreation: Read, Walk, Watch TV, time with family  Support system: /son  Sabianism practices: [facility], [place]     Legal History:  The patient has no significant history of legal issues.    Substance Abuse History:              Alcohol: rarely              Tobacco/Vape: none              Illicit Drugs: none  Marijuana/THC: none  Hallucinogens: none    Abuse/trauma History:              Physical: none              Sexual: none              Emotional/Neglect: none              Death/loss of relationship: Grandmother 1998               Other trauma:  dealing with disability, daily stress in life, father has parkinson's       Family History:  Family History   Problem Relation Age of Onset    Arthritis Father     Diabetes Father     Hyperlipidemia Father     Hypertension Father     Kidney disease Father     ABBE disease Father     Thyroid cancer Father     Alzheimer's disease Maternal Grandmother     Stroke Maternal Grandmother     Arthritis Paternal Grandmother     Hypertension Paternal Grandmother     Cancer Maternal Aunt         Lung    Stroke Paternal Aunt     Heart attack Paternal Grandfather     Alzheimer's disease Paternal Grandfather     Arthritis Paternal Grandfather     Hypertension Paternal Grandfather     Cancer Maternal Uncle         Lung    Breast cancer Cousin     Colon cancer Cousin     Cancer Other        Family Psychiatric History:   Psych diagnoses: Son-Autism, ADHD/Father-depression  Suicide/self harm attempts: none  Substance abuse: none    Patient Medical History:  Are there any significant health issues (current or past): Sleep apnea-cpap, HTN, hx of PCOS, Fibromyalgia   History of seizures: none   History of head injuries: none  History of cardiac issues: HTN  Herbal medications / dietary supplements: Iron, Vit E, B12, Vit D    Past Medical History:   Diagnosis Date    Allergic rhinitis     Anxiety and depression     Arthritis     Asthma     Body piercing     BILATERAL EARS    Bursitis of left hip     h/o    Diarrhea     Fibromyalgia     GERD (gastroesophageal reflux disease)     Hypercupremia     Hypertension     IBS (irritable bowel syndrome)     Migraine     Nausea with vomiting     PCOS (polycystic ovarian syndrome)     PONV (postoperative nausea and vomiting)     Sleep apnea     Sore throat     Strep pharyngitis     Vitamin D deficiency        Past Surgical History:  Past Surgical History:   Procedure Laterality Date    ABDOMINAL SURGERY      BREAST BIOPSY Right     2007    BREAST SURGERY      biopsy      SECTION      CHOLECYSTECTOMY      CYSTECTOMY      pilonidal cyst resection    NISSEN FUNDOPLICATION N/A 10/30/2017    Procedure: NISSEN FUNDOPLICATION LAPAROSCOPIC;  Surgeon: Megha Torres MD;  Location: [facility];  Service:     OTHER SURGICAL HISTORY       History of Neuroplasty With Transposition Of Ulnar Nerve - At Elbow    UPPER GASTROINTESTINAL ENDOSCOPY      WISDOM TOOTH EXTRACTION         Medications:     Current Outpatient Medications:     albuterol sulfate  (90 Base) MCG/ACT inhaler, Inhale 2 puffs by mouth every 6-8 hours as needed., Disp: 18 g, Rfl: 3    amLODIPine (NORVASC) 10 MG tablet, Take 1 tablet by mouth Daily., Disp: 30 tablet, Rfl: 5    [START ON 2025] amphetamine-dextroamphetamine (Adderall) 15 MG tablet, Take 1 tablet by mouth Every Morning AND 1 tablet Every Afternoon., Disp: 60 tablet, Rfl: 0    ARIPiprazole (Abilify) 5 MG tablet, Take 1 tablet by mouth Daily., Disp: 90 tablet, Rfl: 1    Cholecalciferol (VITAMIN D3) 2000 UNITS tablet, Take 5,000 Units by mouth 3 (Three) Times a Day., Disp: , Rfl:     DULoxetine (CYMBALTA) 60 MG capsule, Take 1 capsule by mouth Daily., Disp: 90 capsule, Rfl: 1    esomeprazole (nexIUM) 40 MG capsule, Take 1 capsule by mouth Daily., Disp: 30 capsule, Rfl: 5    Ferrous Gluconate (IRON 27 PO), Take  by mouth Daily., Disp: , Rfl:     galcanezumab-gnlm (Emgality) 120 MG/ML auto-injector pen, Inject contents of 1 syringe (1mL) under the skin once a month, Disp: 1 mL, Rfl: 5    levothyroxine (SYNTHROID, LEVOTHROID) 50 MCG tablet, Take 1 tablet by mouth Daily., Disp: 30 tablet, Rfl: 2    meloxicam (MOBIC) 15 MG tablet, Take 1 tablet by mouth daily., Disp: 30 tablet, Rfl: 5    montelukast (SINGULAIR) 10 MG tablet, Take 1 tablet by mouth Daily., Disp: 90 tablet, Rfl: 3    OXcarbazepine (TRILEPTAL) 300 MG tablet, Take 1 tablet by mouth Every Morning AND 2 tablets Every Night., Disp: 90 tablet, Rfl: 2    pregabalin (Lyrica) 100 MG capsule, Take 1  "capsule by mouth 2 (Two) Times a Day., Disp: 60 capsule, Rfl: 2    spironolactone (ALDACTONE) 25 MG tablet, Take 2 tablets by mouth Daily., Disp: 60 tablet, Rfl: 5    SUMAtriptan (IMITREX) 100 MG tablet, Take 1 tablet by mouth Daily As Needed For Migraine, Disp: 9 tablet, Rfl: 5    [START ON 4/21/2025] temazepam (RESTORIL) 30 MG capsule, Take 1 capsule by mouth At Night As Needed for Sleep., Disp: 30 capsule, Rfl: 2    tiZANidine (ZANAFLEX) 4 MG tablet, Take 1 tablet by mouth 3 (Three) Times a Day As Needed., Disp: 90 tablet, Rfl: 1    vitamin B-12 (CYANOCOBALAMIN) 1000 MCG tablet, Take 1 tablet by mouth Daily., Disp: , Rfl:     VITAMIN E 400 UNIT capsule, Take 1 capsule by mouth Daily., Disp: , Rfl:     Medication Considerations:  OLIVIER reviewed and appropriate.      Allergies:   Allergies   Allergen Reactions    Losartan Other (See Comments)    Metoprolol Hives    Minocycline     Nifedipine Other (See Comments)     Chest discomfort, headache, nausea    Rifampin     Sulfamethoxazole-Trimethoprim     Sulfamethoxazole Rash    Trimethoprim Rash       Past Psychiatric History:  History of outpatient psychiatrist: yes for 15 years last seen 3 months ago  History of outpatient therapy: for a short time.  Not currently   Previous Inpatient hospitalizations: none  Previous diagnoses: Bipolar II/ADHD  Previous medication trials: Adderall,Abilify, Cymbalta, Trileptal, Restoril-feels like they work well   History of suicide attempts: no  History of self harming behaviors: no       Objective   Vital Signs:   Vitals:    04/03/25 1457   BP: 118/78   Weight: 68.1 kg (150 lb 1.6 oz)   Height: 154.9 cm (61\")     Body mass index is 28.36 kg/m².     Mental Status Exam:   MENTAL STATUS EXAM   General Appearance:  Cleanly groomed and dressed  Eye Contact:  Good eye contact  Attitude:  Cooperative  Motor Activity:  Normal gait, posture  Muscle Strength:  Normal  Speech:  Normal rate, tone, volume  Language:  Spontaneous  Mood and " affect:  Normal, pleasant  Hopelessness:  Denies  Loneliness: Denies  Thought Process:  Logical and goal-directed  Associations/ Thought Content:  No delusions  Hallucinations:  None  Suicidal Ideations:  Not present  Homicidal Ideation:  Not present  Sensorium:  Alert and clear  Orientation:  Person, place, time and situation  Immediate Recall, Recent, and Remote Memory:  Intact  Attention Span/ Concentration:  Good  Fund of Knowledge:  Appropriate for age and educational level  Intellectual Functioning:  Average range  Insight:  Good  Judgement:  Good  Reliability:  Good  Impulse Control:  Good       SUICIDE RISK ASSESSMENT/CSSRS:  1. Does patient have thoughts of suicide? no  2. Does patient have intent for suicide? no  3. Does patient have a current plan for suicide? no  4. History of suicide attempts: no  5. Family history of suicide or attempts: no  6. History of violent behaviors towards others or property or thoughts of committing suicide: no  7. History of sexual aggression toward others: no  8. Access to firearms or weapons: no    Labs Reviewed: n/a  UDS Reviewed: n/a  Chart Reviewed: yes    Assessment / Plan      Quality Measures:   Tobacco cessation:Patient denies tobacco use. No tobacco cessation education necessary.     Depression (PHQ >9): Patient screened negative this visit with a PHQ score < 9. Will continue to monitor screening scores and provide supportive care.     Medication Considerations:  Benzo: CSA up to date and on file  Stimulants: CSA up to date and on file   OLIVIER reviewed and appropriate.     Safety: No acute safety concerns    Risk Assessment: Risk of self-harm acutely is low. Risk of self-harm chronically is also low, but could be further elevated in the event of treatment noncompliance and/or AODA.    Impression/Formulation:  Patient appeared alert and oriented.  Patient is voluntarily seeking psychiatric care at [facility].  Patient is receptive to  assistance with maintaining a stable lifestyle.  Conditions being treated include     ICD-10-CM ICD-9-CM   1. Bipolar 2 disorder  F31.81 296.89   2. ADHD (attention deficit hyperactivity disorder), combined type  F90.2 314.01   3. DELILAH (generalized anxiety disorder)  F41.1 300.02   4. Psychophysiological insomnia  F51.04 307.42   .     Visit Diagnosis/Orders Placed This Visit:  Diagnoses and all orders for this visit:    1. Bipolar 2 disorder (Primary)  -     ARIPiprazole (Abilify) 5 MG tablet; Take 1 tablet by mouth Daily.  Dispense: 90 tablet; Refill: 1  -     PHARMACOGENOMICS PROFILE, ACTX - Swab,; Future  -     OXcarbazepine (TRILEPTAL) 300 MG tablet; Take 1 tablet by mouth Every Morning AND 2 tablets Every Night.  Dispense: 90 tablet; Refill: 2    2. ADHD (attention deficit hyperactivity disorder), combined type  -     PHARMACOGENOMICS PROFILE, ACTX - Swab,; Future  -     amphetamine-dextroamphetamine (Adderall) 15 MG tablet; Take 1 tablet by mouth Every Morning AND 1 tablet Every Afternoon.  Dispense: 60 tablet; Refill: 0    3. DELILAH (generalized anxiety disorder)  -     PHARMACOGENOMICS PROFILE, ACTX - Swab,; Future  -     DULoxetine (CYMBALTA) 60 MG capsule; Take 1 capsule by mouth Daily.  Dispense: 90 capsule; Refill: 1    4. Psychophysiological insomnia  -     PHARMACOGENOMICS PROFILE, ACTX - Swab,; Future  -     temazepam (RESTORIL) 30 MG capsule; Take 1 capsule by mouth At Night As Needed for Sleep.  Dispense: 30 capsule; Refill: 2         Treatment Plan:   Bipolar 2 disorder  Patient was diagnosed with bipolar disorder around 15 years ago, and reports that her symptoms have been stable for over 10 years.  Symptoms at that time included periods of insomnia, increased activity, risky behaviors, impulsivity, and racing thoughts, alternating with subsequent severe depression. She attributes her current symptom stability to her current medications, including Abilify 5 mg daily, oxcarbazepine 300 mg in the  morning, and 600 mg at night.  She does not wish to make any changes in medication, and is just seeking continuation of care today.  We will send new prescriptions to her pharmacy, 90-day supply of each.    ADHD  Patient was diagnosed with ADHD several years ago; symptoms at that time included difficulty focusing, lack of concentration, distractibility, procrastination, executive dysfunction, and task arouses. Patient reports symptom stabilization on Adderall 15 mg twice daily.  She does not wish to make any changes in medication, and she is just seeking continuation of care today.  We will continue Adderall 15 mg twice daily, and the patient signed a controlled substances agreement today.    DELILAH  Patient reports that she used to have significant symptoms of anxiety, including restlessness, excessive worry, irritability, and feelings of impending doom; her current dose of Cymbalta 60 mg daily has been effective at managing her symptoms of anxiety, and has also been recommended by her other providers as adjunctive treatment for chronic pain.  She wishes to continue on the medication with no changes.  We will send a prescription for Cymbalta 60 mg daily, 90-day supply.    Psychophysiological insomnia  Patient reports that after trial and error, temazepam 30 mg nightly has been the most consistent medication to treating her insomnia.  She wishes to continue the medication, without making any changes.  We will send a prescription for temazepam 30 mg nightly, with 2 refills, and the patient signed a controlled substances agreement today.      Any medications prescribed have been sent electronically to [facility].     Patient will continue supportive psychotherapy efforts and medications as indicated. Discussed medication options and treatment plan of prescribed medication(s) as well as the risks, benefits, and potential side effects. Patient ackowledged and verbally consented to continue with  current treatment plan and was educated on the importance of compliance with treatment and follow-up appointments. Patient seems reasonably able to adhere to treatment plan.      Assisted Patient in identifying risk factors which would indicate the need for higher level of care including thoughts to harm self or others and/or self-harming behavior and encouraged Patient to contact this office, call 911, or present to the nearest emergency room should any of these events occur. Discussed crisis intervention services and means to access. Clinic will obtain release of information for current treatment team for continuity of care as needed. Patient adamantly and convincingly denies current suicidal or homicidal ideation or perceptual disturbance.     Follow Up:   Return in about 3 months (around 7/3/2025) for Medication Management.        THAIS Jaime  [facility]  (Intake interview completed by THAIS Deutsch Student; provider and student collaborated on diagnoses, treatment plan and medication choices.  Note written by THAIS jalloh; edited, approved and signed by [facility].)  This is electronically signed by THAIS Jaime  04/03/2025 10:36 EDT

## 2025-04-10 PROBLEM — F90.2 ADHD (ATTENTION DEFICIT HYPERACTIVITY DISORDER), COMBINED TYPE: Status: ACTIVE | Noted: 2025-04-10

## 2025-04-10 PROBLEM — F31.81 BIPOLAR 2 DISORDER: Status: ACTIVE | Noted: 2025-04-10

## 2025-04-10 PROBLEM — F41.1 GAD (GENERALIZED ANXIETY DISORDER): Status: ACTIVE | Noted: 2025-04-10

## 2025-05-27 DIAGNOSIS — F90.2 ADHD (ATTENTION DEFICIT HYPERACTIVITY DISORDER), COMBINED TYPE: ICD-10-CM

## 2025-05-27 RX ORDER — DEXTROAMPHETAMINE SACCHARATE, AMPHETAMINE ASPARTATE, DEXTROAMPHETAMINE SULFATE AND AMPHETAMINE SULFATE 3.75; 3.75; 3.75; 3.75 MG/1; MG/1; MG/1; MG/1
TABLET ORAL
Qty: 60 TABLET | Refills: 0 | Status: SHIPPED | OUTPATIENT
Start: 2025-05-27

## 2025-06-27 DIAGNOSIS — F90.2 ADHD (ATTENTION DEFICIT HYPERACTIVITY DISORDER), COMBINED TYPE: ICD-10-CM

## 2025-06-30 DIAGNOSIS — F31.81 BIPOLAR 2 DISORDER: ICD-10-CM

## 2025-06-30 RX ORDER — OXCARBAZEPINE 300 MG/1
TABLET, FILM COATED ORAL
Qty: 90 TABLET | Refills: 2 | Status: SHIPPED | OUTPATIENT
Start: 2025-06-30

## 2025-06-30 RX ORDER — DEXTROAMPHETAMINE SACCHARATE, AMPHETAMINE ASPARTATE, DEXTROAMPHETAMINE SULFATE AND AMPHETAMINE SULFATE 3.75; 3.75; 3.75; 3.75 MG/1; MG/1; MG/1; MG/1
TABLET ORAL
Qty: 60 TABLET | Refills: 0 | Status: SHIPPED | OUTPATIENT
Start: 2025-06-30

## 2025-07-03 ENCOUNTER — OFFICE VISIT (OUTPATIENT)
Dept: BEHAVIORAL HEALTH | Facility: CLINIC | Age: 50
End: 2025-07-03
Payer: COMMERCIAL

## 2025-07-03 VITALS
WEIGHT: 156.2 LBS | HEIGHT: 61 IN | BODY MASS INDEX: 29.49 KG/M2 | SYSTOLIC BLOOD PRESSURE: 122 MMHG | DIASTOLIC BLOOD PRESSURE: 76 MMHG

## 2025-07-03 DIAGNOSIS — F51.04 PSYCHOPHYSIOLOGICAL INSOMNIA: ICD-10-CM

## 2025-07-03 DIAGNOSIS — F90.2 ADHD (ATTENTION DEFICIT HYPERACTIVITY DISORDER), COMBINED TYPE: ICD-10-CM

## 2025-07-03 DIAGNOSIS — Z51.81 THERAPEUTIC DRUG MONITORING: ICD-10-CM

## 2025-07-03 DIAGNOSIS — F31.81 BIPOLAR 2 DISORDER: Primary | ICD-10-CM

## 2025-07-03 DIAGNOSIS — F41.1 GAD (GENERALIZED ANXIETY DISORDER): ICD-10-CM

## 2025-07-03 RX ORDER — TEMAZEPAM 30 MG/1
30 CAPSULE ORAL NIGHTLY PRN
Qty: 30 CAPSULE | Refills: 2 | Status: SHIPPED | OUTPATIENT
Start: 2025-07-03 | End: 2025-07-07 | Stop reason: SDUPTHER

## 2025-07-03 NOTE — PROGRESS NOTES
"           Follow Up Office Visit      Date: 2025   Patient Name: Honey Lama  : 1975   MRN: [id number]     Patient or patient representative verbalized consent for the use of Ambient Listening during the visit with  THAIS Jaime for chart documentation. 7/3/2025  13:13 EDT    Chief Complaint:  Honey Lama is a 50 y.o. female who is here today for follow up with medication management for bipolar disorder, ADHD, anxiety, and insomnia.    History of Present Illness  She reports overall well-being but has been experiencing stress due to her son's recent engagement and marriage. The patient describes the situation as \"all too quick\" and notes that it has been stressful. Despite this, she feels that her medications are effective and does not believe any alterations to her current regimen are necessary.    Interim History: Her anxiety and depression screening scores have improved since the last visit, with her PHQ-9 score decreasing from 7 to 5 and her DELILAH-7 score decreasing from 3 to 1. She attributes her ongoing fatigue to Lyrica, which she takes for fibromyalgia, and her sleep apnea.     Subjective     Review of Systems:   Review of Systems   Psychiatric/Behavioral: Negative.         Screening Scores:   PHQ-9: 5 (last visit, 7)  DELILAH-7: 1 (last visit, 3)    Medications:     Current Outpatient Medications:     albuterol sulfate  (90 Base) MCG/ACT inhaler, Inhale 2 puffs by mouth every 6-8 hours as needed., Disp: 18 g, Rfl: 3    amLODIPine (NORVASC) 10 MG tablet, Take 1 tablet by mouth Daily., Disp: 30 tablet, Rfl: 5    amphetamine-dextroamphetamine (Adderall) 15 MG tablet, Take 1 tablet by mouth Every Morning AND 1 tablet Every Afternoon., Disp: 60 tablet, Rfl: 0    ARIPiprazole (Abilify) 5 MG tablet, Take 1 tablet by mouth Daily., Disp: 90 tablet, Rfl: 1    Cholecalciferol (VITAMIN D3) 2000 UNITS tablet, Take 5,000 Units by mouth 3 (Three) Times a Day., Disp: , Rfl:     " DULoxetine (CYMBALTA) 60 MG capsule, Take 1 capsule by mouth Daily., Disp: 90 capsule, Rfl: 1    DULoxetine (CYMBALTA) 60 MG capsule, Take 1 capsule by mouth Daily., Disp: 90 capsule, Rfl: 3    esomeprazole (nexIUM) 40 MG capsule, Take 1 capsule by mouth Daily., Disp: 30 capsule, Rfl: 5    Ferrous Gluconate (IRON 27 PO), Take  by mouth Daily., Disp: , Rfl:     galcanezumab-gnlm (Emgality) 120 MG/ML auto-injector pen, Inject contents of 1 syringe (1mL) under the skin once a month, Disp: 1 mL, Rfl: 5    galcanezumab-gnlm (Emgality) 120 MG/ML auto-injector pen, Inject 1 mL under the skin into the appropriate area as directed Every 30 (Thirty) Days., Disp: 3 mL, Rfl: 1    levothyroxine (SYNTHROID, LEVOTHROID) 50 MCG tablet, Take 1 tablet by mouth Daily., Disp: 90 tablet, Rfl: 1    meloxicam (MOBIC) 15 MG tablet, Take 1 tablet by mouth Daily., Disp: 30 tablet, Rfl: 5    montelukast (SINGULAIR) 10 MG tablet, Take 1 tablet by mouth Daily., Disp: 90 tablet, Rfl: 3    OXcarbazepine (TRILEPTAL) 300 MG tablet, Take 1 tablet by mouth Every Morning AND 2 tablets Every Night., Disp: 90 tablet, Rfl: 2    pregabalin (Lyrica) 100 MG capsule, Take 1 capsule by mouth 2 (Two) Times a Day., Disp: 180 capsule, Rfl: 1    spironolactone (ALDACTONE) 25 MG tablet, Take 2 tablets by mouth Daily., Disp: 60 tablet, Rfl: 5    SUMAtriptan (IMITREX) 100 MG tablet, Take 1 tablet by mouth Daily As Needed For Migraine, Disp: 9 tablet, Rfl: 5    temazepam (RESTORIL) 30 MG capsule, Take 1 capsule by mouth At Night As Needed for Sleep., Disp: 30 capsule, Rfl: 2    tiZANidine (ZANAFLEX) 4 MG tablet, Take 1 tablet by mouth 3 (Three) Times a Day As Needed., Disp: 90 tablet, Rfl: 1    vitamin B-12 (CYANOCOBALAMIN) 1000 MCG tablet, Take 1 tablet by mouth Daily., Disp: , Rfl:     VITAMIN E 400 UNIT capsule, Take 1 capsule by mouth Daily., Disp: , Rfl:     Allergies:   Allergies   Allergen Reactions    Losartan Other (See Comments)    Metoprolol Hives     "Minocycline     Nifedipine Other (See Comments)     Chest discomfort, headache, nausea    Rifampin     Sulfamethoxazole-Trimethoprim     Sulfamethoxazole Rash    Trimethoprim Rash       Results Reviewed: n/a     The following portion of the patient's history were reviewed and updated appropriately: allergies, current and past medications, family history, medical history and social history.    Objective     Vital Signs:   Vitals:    07/03/25 1305   BP: 122/76   Weight: 70.9 kg (156 lb 3.2 oz)   Height: 154.9 cm (61\")     Body mass index is 29.51 kg/m².     Mental Status Exam:   MENTAL STATUS EXAM   General Appearance:  Cleanly groomed and dressed  Eye Contact:  Good eye contact  Attitude:  Cooperative  Motor Activity:  Normal gait, posture  Muscle Strength:  Normal  Speech:  Normal rate, tone, volume  Language:  Spontaneous  Mood and affect:  Normal, pleasant  Hopelessness:  Denies  Loneliness: Denies  Thought Process:  Logical  Associations/ Thought Content:  No delusions  Hallucinations:  None  Suicidal Ideations:  Not present  Homicidal Ideation:  Not present  Sensorium:  Alert and clear  Orientation:  Person, place, time and situation  Immediate Recall, Recent, and Remote Memory:  Intact  Attention Span/ Concentration:  Good  Fund of Knowledge:  Appropriate for age and educational level  Intellectual Functioning:  Average range  Insight:  Good  Judgement:  Good  Reliability:  Good  Impulse Control:  Good        SUICIDE RISK ASSESSMENT/CSSRS:  1. Does patient have thoughts of suicide? no  2. Does patient have intent for suicide? no  3. Does patient have a current plan for suicide? no  4. History of suicide attempts: no  5. Family history of suicide or attempts: no  6. History of violent behaviors towards others or property or thoughts of committing suicide: no  7. History of sexual aggression toward others: no  8. Access to firearms or weapons: no    Labs Reviewed: n/a  UDS Reviewed: ordered today  Chart since " last visit reviewed: yes    Assessment / Plan    Quality Measures:  Tobacco cessation: Patient denies tobacco use. No tobacco cessation education necessary.      Depression (PHQ >9): Patient screened negative this visit with a PHQ score < 9. Will continue to monitor screening scores and provide supportive care.     Medication Considerations:  Benzo: CSA up to date and on file   Stimulants: CSA up to date and on file    OLIVIER reviewed and appropriate.     Risk Assessment: Risk of self-harm acutely is low. Risk of self-harm chronically is also low, but could be further elevated in the event of treatment noncompliance and/or AODA.    Impression/Formulation:  Patient appeared alert and oriented.  Patient is voluntarily seeking psychiatric care at [facility].  Patient is receptive to assistance with maintaining a stable lifestyle.  Conditions being treated include     ICD-10-CM ICD-9-CM   1. Bipolar 2 disorder  F31.81 296.89   2. ADHD (attention deficit hyperactivity disorder), combined type  F90.2 314.01   3. DELILAH (generalized anxiety disorder)  F41.1 300.02   4. Psychophysiological insomnia  F51.04 307.42   5. Therapeutic drug monitoring  Z51.81 V58.83   .     Visit Diagnosis/Orders Placed This Visit:  Diagnoses and all orders for this visit:    1. Bipolar 2 disorder (Primary)    2. ADHD (attention deficit hyperactivity disorder), combined type    3. DELILAH (generalized anxiety disorder)    4. Psychophysiological insomnia  -     temazepam (RESTORIL) 30 MG capsule; Take 1 capsule by mouth At Night As Needed for Sleep.  Dispense: 30 capsule; Refill: 2    5. Therapeutic drug monitoring  -     Compliance Drug Analysis, Ur - Urine, Clean Catch; Future         Assessment & Plan  Content of Therapy:  During the session, the patient discussed the stress related to her son's recent engagement and marriage. The conversation explored her feelings about the rapid changes and her concerns about the future. The  clinician emphasized the importance of experiencing emotions while managing symptoms with medication. The patient reported no need for changes in her current medication regimen. The discussion also included the patient's chronic pain and its impact on her anxiety and fatigue.    Clinical Impression:  The patient appears to be managing her anxiety and depression well, as evidenced by the improvement in her PHQ and DELILAH scores from 7 and 3 to 5 and 1, respectively. She reports feeling okay and does not believe any changes to her medication are necessary. Her chronic pain and sleep apnea continue to contribute to her fatigue, but these symptoms are consistent with her ongoing conditions.    Therapeutic Intervention:  The clinician provided reassurance about the patient's current medication regimen and emphasized the importance of experiencing emotions while managing symptoms. The clinician also discussed the need for a urine drug screen within the next year to ensure compliance with medication.    Plan:  - Continue current medication regimen (Cymbalta 60 mg nightly, Abilify 5 mg nightly, Trileptal 300 mg in the morning and 600 mg at night, Adderall 15 mg twice daily, temazepam 30 mg nightly).  - Complete urine drug screen within the next year.  - Monitor symptoms and report any changes or need for medication adjustments.    Follow-up:  - Schedule follow-up appointment in 3 months.    Notes & Risk Factors:  - Stress related to son's engagement and marriage.  - Chronic pain contributing to anxiety and fatigue.  - Protective factors include a supportive relationship with the son's fiancée's mother.    Any medications prescribed have been sent electronically to [facility].       Patient will continue supportive psychotherapy efforts and medications as indicated.  Discussed medication options and treatment plan of prescribed medication(s) as well as the risks, benefits, and potential side  effects. Patient will contact this office, call 911 or present to the nearest emergency room should suicidal or homicidal ideations occur. Clinic will obtain release of information for current treatment team for continuity of care as needed. Patient ackowledged and verbally consented to continue with current treatment plan and was educated on the importance of compliance with treatment and follow-up appointments.           THAIS Cortes  [facility]    This is electronically signed by THAIS Cortes  07/03/2025 13:13 EDT

## 2025-07-05 ENCOUNTER — LAB (OUTPATIENT)
Dept: LAB | Facility: HOSPITAL | Age: 50
End: 2025-07-05
Payer: COMMERCIAL

## 2025-07-05 ENCOUNTER — TRANSCRIBE ORDERS (OUTPATIENT)
Dept: LAB | Facility: HOSPITAL | Age: 50
End: 2025-07-05
Payer: COMMERCIAL

## 2025-07-05 DIAGNOSIS — E55.9 VITAMIN D DEFICIENCY: ICD-10-CM

## 2025-07-05 DIAGNOSIS — R53.83 OTHER FATIGUE: ICD-10-CM

## 2025-07-05 DIAGNOSIS — Z00.00 ROUTINE MEDICAL EXAM: Primary | ICD-10-CM

## 2025-07-05 DIAGNOSIS — R73.9 HIGH BLOOD SUGAR: ICD-10-CM

## 2025-07-05 DIAGNOSIS — Z00.00 ROUTINE MEDICAL EXAM: ICD-10-CM

## 2025-07-05 DIAGNOSIS — E03.8 PITUITARY HYPOTHYROIDISM: ICD-10-CM

## 2025-07-05 LAB
25(OH)D3 SERPL-MCNC: 36.2 NG/ML (ref 30–100)
ALBUMIN SERPL-MCNC: 4.4 G/DL (ref 3.5–5.2)
ALBUMIN/GLOB SERPL: 1.4 G/DL
ALP SERPL-CCNC: 111 U/L (ref 39–117)
ALT SERPL W P-5'-P-CCNC: 15 U/L (ref 1–33)
ANION GAP SERPL CALCULATED.3IONS-SCNC: 12 MMOL/L (ref 5–15)
AST SERPL-CCNC: 20 U/L (ref 1–32)
BILIRUB SERPL-MCNC: 0.2 MG/DL (ref 0–1.2)
BUN SERPL-MCNC: 15 MG/DL (ref 6–20)
BUN/CREAT SERPL: 22.1 (ref 7–25)
CALCIUM SPEC-SCNC: 9.1 MG/DL (ref 8.6–10.5)
CHLORIDE SERPL-SCNC: 102 MMOL/L (ref 98–107)
CO2 SERPL-SCNC: 24 MMOL/L (ref 22–29)
CREAT SERPL-MCNC: 0.68 MG/DL (ref 0.57–1)
DEPRECATED RDW RBC AUTO: 41.5 FL (ref 37–54)
EGFRCR SERPLBLD CKD-EPI 2021: 106.3 ML/MIN/1.73
ERYTHROCYTE [DISTWIDTH] IN BLOOD BY AUTOMATED COUNT: 12.7 % (ref 12.3–15.4)
GLOBULIN UR ELPH-MCNC: 3.2 GM/DL
GLUCOSE SERPL-MCNC: 74 MG/DL (ref 65–99)
HBA1C MFR BLD: 5.4 % (ref 4.8–5.6)
HCT VFR BLD AUTO: 43.5 % (ref 34–46.6)
HGB BLD-MCNC: 14.1 G/DL (ref 12–15.9)
IRON 24H UR-MRATE: 111 MCG/DL (ref 37–145)
IRON SATN MFR SERPL: 25 % (ref 20–50)
MCH RBC QN AUTO: 28.9 PG (ref 26.6–33)
MCHC RBC AUTO-ENTMCNC: 32.4 G/DL (ref 31.5–35.7)
MCV RBC AUTO: 89.1 FL (ref 79–97)
PLATELET # BLD AUTO: 310 10*3/MM3 (ref 140–450)
PMV BLD AUTO: 9 FL (ref 6–12)
POTASSIUM SERPL-SCNC: 4.2 MMOL/L (ref 3.5–5.2)
PROT SERPL-MCNC: 7.6 G/DL (ref 6–8.5)
RBC # BLD AUTO: 4.88 10*6/MM3 (ref 3.77–5.28)
SODIUM SERPL-SCNC: 138 MMOL/L (ref 136–145)
T4 FREE SERPL-MCNC: 0.86 NG/DL (ref 0.92–1.68)
TIBC SERPL-MCNC: 453 MCG/DL (ref 298–536)
TRANSFERRIN SERPL-MCNC: 304 MG/DL (ref 200–360)
TSH SERPL DL<=0.05 MIU/L-ACNC: 0.88 UIU/ML (ref 0.27–4.2)
VIT B12 BLD-MCNC: 857 PG/ML (ref 211–946)
WBC NRBC COR # BLD AUTO: 7.76 10*3/MM3 (ref 3.4–10.8)

## 2025-07-05 PROCEDURE — 83036 HEMOGLOBIN GLYCOSYLATED A1C: CPT

## 2025-07-05 PROCEDURE — 84439 ASSAY OF FREE THYROXINE: CPT

## 2025-07-05 PROCEDURE — 82306 VITAMIN D 25 HYDROXY: CPT

## 2025-07-05 PROCEDURE — 80050 GENERAL HEALTH PANEL: CPT

## 2025-07-05 PROCEDURE — 36415 COLL VENOUS BLD VENIPUNCTURE: CPT

## 2025-07-05 PROCEDURE — 83540 ASSAY OF IRON: CPT

## 2025-07-05 PROCEDURE — 84466 ASSAY OF TRANSFERRIN: CPT

## 2025-07-05 PROCEDURE — 82607 VITAMIN B-12: CPT

## 2025-07-07 DIAGNOSIS — F51.04 PSYCHOPHYSIOLOGICAL INSOMNIA: ICD-10-CM

## 2025-07-07 RX ORDER — TEMAZEPAM 30 MG/1
30 CAPSULE ORAL NIGHTLY PRN
Qty: 30 CAPSULE | Refills: 2 | Status: SHIPPED | OUTPATIENT
Start: 2025-07-07

## 2025-07-07 NOTE — TELEPHONE ENCOUNTER
Patient stated her last refill only came in with a qty of 15, checked [facility] and was sent as qty 15 on 6/23. Patient needs refill sent to [facility] pharmacy. Patient stated she has 2 left

## 2025-07-07 NOTE — TELEPHONE ENCOUNTER
Prescription was for 30 doses at last refill, but sometimes pharmacies will fill lower quantity when their supply is limited. Will send another prescription for 30 day supply, with refills available.

## 2025-07-17 ENCOUNTER — SPECIALTY PHARMACY (OUTPATIENT)
Dept: PHARMACY | Facility: TELEHEALTH | Age: 50
End: 2025-07-17
Payer: COMMERCIAL

## 2025-07-29 DIAGNOSIS — F90.2 ADHD (ATTENTION DEFICIT HYPERACTIVITY DISORDER), COMBINED TYPE: ICD-10-CM

## 2025-07-29 RX ORDER — DEXTROAMPHETAMINE SACCHARATE, AMPHETAMINE ASPARTATE, DEXTROAMPHETAMINE SULFATE AND AMPHETAMINE SULFATE 3.75; 3.75; 3.75; 3.75 MG/1; MG/1; MG/1; MG/1
TABLET ORAL
Qty: 60 TABLET | Refills: 0 | Status: SHIPPED | OUTPATIENT
Start: 2025-07-29

## 2025-08-22 ENCOUNTER — PHARMACOGENOMICS (OUTPATIENT)
Dept: PHARMACY | Facility: HOSPITAL | Age: 50
End: 2025-08-22
Payer: COMMERCIAL

## (undated) DEVICE — 5 MM BABCOCKS WITH RATCHET HANDLES: Brand: ENDOPATH

## (undated) DEVICE — POLYESTER SINGLE STITCH RELOAD: Brand: SURGIDAC

## (undated) DEVICE — CUFF SCD HEMOFORCE SEQ CALF STD MD

## (undated) DEVICE — SUT VIC 0/0 UR6 27IN DYED J603H

## (undated) DEVICE — MONOPOLAR METZENBAUM SCISSOR, MINI BLADE TIP, DISPOSABLE: Brand: MONOPOLAR METZENBAUM SCISSOR, MINI BLADE TIP, DISPOSABLE

## (undated) DEVICE — UNDYED BRAIDED (POLYGLACTIN 910), SYNTHETIC ABSORBABLE SUTURE: Brand: COATED VICRYL

## (undated) DEVICE — 3M™ STERI-STRIP™ REINFORCED ADHESIVE SKIN CLOSURES, R1547, 1/2 IN X 4 IN (12 MM X 100 MM), 6 STRIPS/ENVELOPE: Brand: 3M™ STERI-STRIP™

## (undated) DEVICE — GLV SURG TRIUMPH MICRO PF LTX 7.5 STRL

## (undated) DEVICE — HARMONIC ACE +7 LAPAROSCOPIC SHEARS ADVANCED HEMOSTASIS 5MM DIAMETER 36CM SHAFT LENGTH  FOR USE WITH GRAY HAND PIECE ONLY: Brand: HARMONIC ACE

## (undated) DEVICE — GLV SURG SENSICARE W/ALOE PF LF 7.5 STRL

## (undated) DEVICE — BLD CLIP UNIV SURG GRY

## (undated) DEVICE — PACK,SET UP,NO DRAPES: Brand: MEDLINE

## (undated) DEVICE — 2, DISPOSABLE SUCTION/IRRIGATOR WITHOUT DISPOSABLE TIP: Brand: STRYKEFLOW

## (undated) DEVICE — RICH GENERAL LAPAROSCOPY: Brand: MEDLINE INDUSTRIES, INC.

## (undated) DEVICE — INVIEW CLEAR LEGGINGS: Brand: CONVERTORS

## (undated) DEVICE — ENDOPATH XCEL BLADELESS TROCARS WITH STABILITY SLEEVES: Brand: ENDOPATH XCEL

## (undated) DEVICE — GLV SURG SENSICARE W/ALOE PF LF 6.5 STRL

## (undated) DEVICE — ENDOPATH XCEL UNIVERSAL TROCAR STABLILITY SLEEVES: Brand: ENDOPATH XCEL

## (undated) DEVICE — SUTURING DEVICE: Brand: ENDO STITCH

## (undated) DEVICE — JELLY,LUBE,STERILE,FLIP TOP,TUBE,2-OZ: Brand: MEDLINE

## (undated) DEVICE — DRN PENRS 1/4X18IN LTX